# Patient Record
Sex: FEMALE | Race: WHITE | NOT HISPANIC OR LATINO | URBAN - METROPOLITAN AREA
[De-identification: names, ages, dates, MRNs, and addresses within clinical notes are randomized per-mention and may not be internally consistent; named-entity substitution may affect disease eponyms.]

---

## 2018-07-26 ENCOUNTER — EVALUATION (OUTPATIENT)
Dept: PHYSICAL THERAPY | Facility: CLINIC | Age: 63
End: 2018-07-26
Payer: COMMERCIAL

## 2018-07-26 ENCOUNTER — TRANSCRIBE ORDERS (OUTPATIENT)
Dept: PHYSICAL THERAPY | Facility: CLINIC | Age: 63
End: 2018-07-26

## 2018-07-26 DIAGNOSIS — M54.2 CERVICALGIA: Primary | ICD-10-CM

## 2018-07-26 PROCEDURE — 97110 THERAPEUTIC EXERCISES: CPT | Performed by: PHYSICAL THERAPIST

## 2018-07-26 PROCEDURE — G8981 BODY POS CURRENT STATUS: HCPCS | Performed by: PHYSICAL THERAPIST

## 2018-07-26 PROCEDURE — 97161 PT EVAL LOW COMPLEX 20 MIN: CPT | Performed by: PHYSICAL THERAPIST

## 2018-07-26 PROCEDURE — G8982 BODY POS GOAL STATUS: HCPCS | Performed by: PHYSICAL THERAPIST

## 2018-07-26 RX ORDER — LEVOTHYROXINE SODIUM 175 UG/1
175 TABLET ORAL DAILY
COMMUNITY

## 2018-07-26 RX ORDER — VENLAFAXINE 75 MG/1
75 TABLET ORAL 2 TIMES DAILY
COMMUNITY

## 2018-07-26 RX ORDER — METAXALONE 800 MG/1
800 TABLET ORAL 3 TIMES DAILY
COMMUNITY

## 2018-07-26 NOTE — LETTER
2018    Tiffanie Fuller DO  [address]  [facility] [address]    Patient: Jose Roberto Oneal   YOB: 1955   Date of Visit: 2018     Encounter Diagnosis     ICD-10-CM    1  Cervicalgia M54 2        Dear Dr Aviles Blend:    Please review the attached Plan of Care from West jeremie Sheridan's recent visit  Please verify that you agree therapy should continue by signing the attached document and sending it back to our office  If you have any questions or concerns, please don't hesitate to call  Sincerely,    Arlen Berumen, PT      Referring Provider:      I certify that I have read the below Plan of Care and certify the need for these services furnished under this plan of treatment while under my care  Tiffanie Fuller DO  [address] [address]  VIA Facsimile: [phone]          PT Evaluation     Today's date: 2018  Patient name: Jose Roberto Oneal  : 1955  MRN: [id number]  Referring provider: Krunal Delgadillo DO  Dx:   Encounter Diagnosis     ICD-10-CM    1  Cervicalgia M54 2                   Assessment  Impairments: abnormal muscle firing, abnormal muscle tone, abnormal or restricted ROM, abnormal movement, activity intolerance, impaired physical strength, lacks appropriate home exercise program, pain with function and weight-bearing intolerance    Assessment details: Pt is a 65 y o  female that presents with decreased strength, increased pain, decreased rom, functional limitations, and symptoms of c/s ddd, postural dysfunction, and possible right sided radiculopathy  Pt would benefit from skilled PT to return her to a more functional condition  Understanding of Dx/Px/POC: good   Prognosis: good    Goals  1  Pt will have 0/10 pain at rest in 4 weeks  2  Pt will have <2/10 pain with activity in 6-8 weeks  3  Pt will have full arom of the UE's and c/s in 6 weeks  4  Pt will have full prom of the UE's and c/s in 3 weeks  6   Pt will be able to lift 5# overhead with out pain in 6 weeks    Plan  Patient would benefit from: skilled PT  Referral necessary: No  Planned modality interventions: TENS, thermotherapy: hydrocollator packs and cryotherapy  Planned therapy interventions: neuromuscular re-education, manual therapy, therapeutic activities, therapeutic exercise and home exercise program  Frequency: 2x week  Duration in weeks: 8  Plan of Care beginning date: 2018  Plan of Care expiration date: 2018  Treatment plan discussed with: patient        Subjective Evaluation    History of Present Illness  Mechanism of injury: Pt notes that she has a long hx of c/s pain but over the past 3-4 months it has been unbearable and has forced her to leave work at times  She notes that she has HA almost daily  She notes that she gets numbness in the right UE down the back of her arm  She notes that she is off of work until this Monday  She notes that she works on a [employer] all day every day  She has not been able to sleep well for weeks, because of increased discomfort, unless she takes tylenol before bed time  Pain  Current pain ratin  At best pain ratin  At worst pain ratin  Quality: sharp, radiating, dull ache, discomfort, pulling and tight  Aggravating factors: overhead activity and lifting  Progression: worsening    Treatments  Previous treatment: injection treatment and physical therapy  Patient Goals  Patient goals for therapy: decreased pain, increased motion, independence with ADLs/IADLs, increased strength and return to sport/leisure activities          Objective     Special Questions  Positive for night pain, disturbed sleep, dizziness, headaches and visual change   Negative for faints, nausea/motion sickness, tinnitus, trouble swallowing, difficulty breathing, shortness of breath, respiratory pain, history of cancer and history of trauma    Additional Special Questions  Night pain over last 3 months in c/s helped with tylenol, + HA after a day of work, + changes in vision associated with HA    Postural Observations  Seated posture: poor  Standing posture: fair  Correction of posture: has no consistent effect        Palpation   Left   Hypertonic in the scalenes  Tenderness of the levator scapulae, lower trapezius, middle trapezius, scalenes and suboccipitals  Right   Hypertonic in the scalenes and sternocleidomastoid  Tenderness of the levator scapulae, lower trapezius, middle trapezius, scalenes, sternocleidomastoid and suboccipitals       Neurological Testing     Sensation   Cervical/Thoracic   Left   Intact: light touch    Right   Intact: light touch    Reflexes   Left   Biceps (C5/C6): normal (2+)  Brachioradialis (C6): normal (2+)  Triceps (C7): normal (2+)    Right   Biceps (C5/C6): normal (2+)  Brachioradialis (C6): normal (2+)  Triceps (C7): trace (1+)    Active Range of Motion   Cervical/Thoracic Spine   Cervical    Flexion: 55 degrees with pain  Extension: 15 degrees with pain  Left lateral flexion: 15 degrees with pain  Right lateral flexion: 20 degrees with pain  Left rotation: 50 degrees   Right rotation: 55 degrees with pain    Thoracic   Extension: with pain    Joint Play Hypomobile: C4, C5, C6, C7, T1, T2, T3, T4, T5 and T6     Strength/Myotome Testing   Cervical Spine   Neck extension: 3-  Neck flexion: 3+    Left Shoulder     Planes of Motion   Abduction: 4   External rotation at 0°:  3+     Right Shoulder     Planes of Motion   Abduction: 4+   External rotation at 0°:  4-     Left Elbow   Flexion: 4+  Extension: 3+    Right Elbow   Flexion: 4+  Extension: 4-    Left Wrist/Hand   Wrist extension: 4+  Wrist flexion: 3+    Right Wrist/Hand   Wrist extension: 4+  Wrist flexion: 4    Tests   Cervical     Left   Positive cervical distraction, Spurling's sign and cervical compression test       Right   Positive cervical distraction, Spurling's sign and cervical compression test    TMJ   Jaw observations: facial symmetry within normal limits  Jaw findings: + deviation upon opening and closing with the deviation to the right  Occlusion class: class I (normal)  good oral hygiene  Joint sounds left: normal  ROM: limited          Precautions: Fibromyalgia      Daily Treatment Diary       Manuals                                                                 Exercise Diary                                                                                                                                                                                                                                                                                                            Modalities

## 2018-07-26 NOTE — PROGRESS NOTES
PT Evaluation     Today's date: 2018  Patient name: Cristopher Mortimer  : 1955  MRN: [id number]  Referring provider: Mohamud San DO  Dx:   Encounter Diagnosis     ICD-10-CM    1  Cervicalgia M54 2                   Assessment  Impairments: abnormal muscle firing, abnormal muscle tone, abnormal or restricted ROM, abnormal movement, activity intolerance, impaired physical strength, lacks appropriate home exercise program, pain with function and weight-bearing intolerance    Assessment details: Pt is a 65 y o  female that presents with decreased strength, increased pain, decreased rom, functional limitations, and symptoms of c/s ddd, postural dysfunction, and possible right sided radiculopathy  Pt would benefit from skilled PT to return her to a more functional condition  Understanding of Dx/Px/POC: good   Prognosis: good    Goals  1  Pt will have 0/10 pain at rest in 4 weeks  2  Pt will have <2/10 pain with activity in 6-8 weeks  3  Pt will have full arom of the UE's and c/s in 6 weeks  4  Pt will have full prom of the UE's and c/s in 3 weeks  6  Pt will be able to lift 5# overhead with out pain in 6 weeks    Plan  Patient would benefit from: skilled PT  Referral necessary: No  Planned modality interventions: TENS, thermotherapy: hydrocollator packs and cryotherapy  Planned therapy interventions: neuromuscular re-education, manual therapy, therapeutic activities, therapeutic exercise and home exercise program  Frequency: 2x week  Duration in weeks: 8  Plan of Care beginning date: 2018  Plan of Care expiration date: 2018  Treatment plan discussed with: patient        Subjective Evaluation    History of Present Illness  Mechanism of injury: Pt notes that she has a long hx of c/s pain but over the past 3-4 months it has been unbearable and has forced her to leave work at times  She notes that she has HA almost daily  She notes that she gets numbness in the right UE down the back of her arm  She notes that she is off of work until this Monday  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  She has not been able to sleep well for weeks, because of increased discomfort, unless she takes tylenol before bed time  Pain  Current pain ratin  At best pain ratin  At worst pain ratin  Quality: sharp, radiating, dull ache, discomfort, pulling and tight  Aggravating factors: overhead activity and lifting  Progression: worsening    Treatments  Previous treatment: injection treatment and physical therapy  Patient Goals  Patient goals for therapy: decreased pain, increased motion, independence with ADLs/IADLs, increased strength and return to sport/leisure activities          Objective     Special Questions  Positive for night pain, disturbed sleep, dizziness, headaches and visual change  Negative for faints, nausea/motion sickness, tinnitus, trouble swallowing, difficulty breathing, shortness of breath, respiratory pain, history of cancer and history of trauma    Additional Special Questions  Night pain over last 3 months in c/s helped with tylenol, + HA after a day of work, + changes in vision associated with HA    Postural Observations  Seated posture: poor  Standing posture: fair  Correction of posture: has no consistent effect        Palpation   Left   Hypertonic in the scalenes  Tenderness of the levator scapulae, lower trapezius, middle trapezius, scalenes and suboccipitals  Right   Hypertonic in the scalenes and sternocleidomastoid  Tenderness of the levator scapulae, lower trapezius, middle trapezius, scalenes, sternocleidomastoid and suboccipitals       Neurological Testing     Sensation   Cervical/Thoracic   Left   Intact: light touch    Right   Intact: light touch    Reflexes   Left   Biceps (C5/C6): normal (2+)  Brachioradialis (C6): normal (2+)  Triceps (C7): normal (2+)    Right   Biceps (C5/C6): normal (2+)  Brachioradialis (C6): normal (2+)  Triceps (C7): trace (1+)    Active Range of Motion   Cervical/Thoracic Spine   Cervical    Flexion: 55 degrees with pain  Extension: 15 degrees with pain  Left lateral flexion: 15 degrees with pain  Right lateral flexion: 20 degrees with pain  Left rotation: 50 degrees   Right rotation: 55 degrees with pain    Thoracic   Extension: with pain    Joint Play Hypomobile: C4, C5, C6, C7, T1, T2, T3, T4, T5 and T6     Strength/Myotome Testing   Cervical Spine   Neck extension: 3-  Neck flexion: 3+    Left Shoulder     Planes of Motion   Abduction: 4   External rotation at 0°: 3+     Right Shoulder     Planes of Motion   Abduction: 4+   External rotation at 0°: 4-     Left Elbow   Flexion: 4+  Extension: 3+    Right Elbow   Flexion: 4+  Extension: 4-    Left Wrist/Hand   Wrist extension: 4+  Wrist flexion: 3+    Right Wrist/Hand   Wrist extension: 4+  Wrist flexion: 4    Tests   Cervical     Left   Positive cervical distraction, Spurling's sign and cervical compression test       Right   Positive cervical distraction, Spurling's sign and cervical compression test    TMJ   Jaw observations: facial symmetry within normal limits  Jaw findings: + deviation upon opening and closing with the deviation to the right  Occlusion class: class I (normal)  good oral hygiene  Joint sounds left: normal  ROM: limited          Precautions: Fibromyalgia      Daily Treatment Diary       Manuals                                                                 Exercise Diary                                                                                                                                                                                                                                                                                                            Modalities

## 2018-07-30 ENCOUNTER — OFFICE VISIT (OUTPATIENT)
Dept: PHYSICAL THERAPY | Facility: CLINIC | Age: 63
End: 2018-07-30
Payer: COMMERCIAL

## 2018-07-30 DIAGNOSIS — M54.2 CERVICALGIA: Primary | ICD-10-CM

## 2018-07-30 PROCEDURE — 97140 MANUAL THERAPY 1/> REGIONS: CPT

## 2018-07-30 PROCEDURE — G0283 ELEC STIM OTHER THAN WOUND: HCPCS

## 2018-07-30 PROCEDURE — 97014 ELECTRIC STIMULATION THERAPY: CPT

## 2018-07-30 PROCEDURE — 97110 THERAPEUTIC EXERCISES: CPT

## 2018-07-30 PROCEDURE — 97010 HOT OR COLD PACKS THERAPY: CPT

## 2018-07-30 NOTE — PROGRESS NOTES
Daily Note     Today's date: 2018  Patient name: Kasey Espinosa  : 1955  MRN: [id number]  Referring provider: Jeanne Demarco DO  Dx:   Encounter Diagnosis     ICD-10-CM    1  Cervicalgia M54 2                   Subjective: pt is noting that she has a HA along with increased pain but is unsure why , she doesn't feel like exercises are increasing pain  She is worried as she RTW tomorrow      Objective: See treatment diary below  Precautions: Fibromyalgia      Daily Treatment Diary       Manuals             STM B UT/levator scaleprice  10'            SOR Not marjorie            Mobs t/s By DB DPT                         Exercise Diary              Chin tuck   5"x7            iso LB/rot B 5"x5            Prone I's 2x10                                                                                                                                                                                                                                                                   Modalities             MH/TENS  Cont set post 10'                           MH and TENS on continuous setting in supine post treatment      Assessment: Tolerated treatment fair with minimal tolerance to STM B with increased HA noted but was able to tolerate joint mobs without increased sxs  Patient would benefit from continued PT  Pt states that she felt better with less intense HA post treatment session  Plan: Progress treatment as tolerated

## 2018-08-07 ENCOUNTER — OFFICE VISIT (OUTPATIENT)
Dept: PHYSICAL THERAPY | Facility: CLINIC | Age: 63
End: 2018-08-07
Payer: COMMERCIAL

## 2018-08-07 DIAGNOSIS — M54.2 CERVICALGIA: Primary | ICD-10-CM

## 2018-08-07 PROCEDURE — 97110 THERAPEUTIC EXERCISES: CPT

## 2018-08-07 PROCEDURE — G0283 ELEC STIM OTHER THAN WOUND: HCPCS

## 2018-08-07 PROCEDURE — 97010 HOT OR COLD PACKS THERAPY: CPT

## 2018-08-07 PROCEDURE — G8982 BODY POS GOAL STATUS: HCPCS

## 2018-08-07 PROCEDURE — 97140 MANUAL THERAPY 1/> REGIONS: CPT

## 2018-08-07 PROCEDURE — G8981 BODY POS CURRENT STATUS: HCPCS

## 2018-08-07 PROCEDURE — 97014 ELECTRIC STIMULATION THERAPY: CPT

## 2018-08-07 NOTE — PROGRESS NOTES
Daily Note     Today's date: 2018  Patient name: Gloria Holder  : 1955  MRN: [id number]  Referring provider: Jose C Seen,   Dx:   Encounter Diagnosis     ICD-10-CM    1  Cervicalgia M54 2                   Subjective: pt states that she did have less of a HA for 2 days following therapy  Objective: See treatment diary below  Precautions: Fibromyalgia      Daily Treatment Diary       Manuals            STM B UT/jaret robert  10' 12'           SOR Not marjorie Not marjorie           Mobs t/s By DB DPT Db dpt                        Exercise Diary              Chin tuck   5"x7 5"x7           iso LB/rot B 5"x5 5"x5           Prone I's 2x10 2x10                                                                                                                                                                                                                                                                  Modalities             MH/TENS  Cont set post 10'                             Assessment: Tolerated treatment with less TTP over UT/SCM B but still unable to tolerate SOR which increases HA  More tightness noted thoracic region  Plan: Progress treatment as tolerated

## 2018-08-16 ENCOUNTER — OFFICE VISIT (OUTPATIENT)
Dept: PHYSICAL THERAPY | Facility: CLINIC | Age: 63
End: 2018-08-16
Payer: COMMERCIAL

## 2018-08-16 DIAGNOSIS — M54.2 CERVICALGIA: Primary | ICD-10-CM

## 2018-08-16 PROCEDURE — G0283 ELEC STIM OTHER THAN WOUND: HCPCS

## 2018-08-16 PROCEDURE — 97140 MANUAL THERAPY 1/> REGIONS: CPT

## 2018-08-16 PROCEDURE — 97110 THERAPEUTIC EXERCISES: CPT

## 2018-08-16 PROCEDURE — 97014 ELECTRIC STIMULATION THERAPY: CPT

## 2018-08-16 PROCEDURE — 97010 HOT OR COLD PACKS THERAPY: CPT

## 2018-08-16 NOTE — PROGRESS NOTES
Daily Note     Today's date: 2018  Patient name: Latia Soto  : 1955  MRN: [id number]  Referring provider: Rosalinda Whyte DO  Dx:   Encounter Diagnosis     ICD-10-CM    1  Cervicalgia M54 2                   Subjective: fatigued more today      Objective: See treatment diary below  Precautions: Fibromyalgia      Daily Treatment Diary       Manuals           STM B UT/jaret robert  10' 12' 12'          SOR Not marjorie Not marjorie           Mobs t/s By DB DPT Db dpt RN DPT                       Exercise Diary              Chin tuck   5"x7 5"x7 5"x10          iso LB/rot B 5"x5 5"x5 5"x5          Prone I's 2x10 2x10 2x10          Chin tuck and lift   x5          Prone row   2x10                                                                                                                                                                                                                                       Modalities             MH/TENS  Cont set post 10'                               Assessment: Tolerated treatment with ability to add exercises per flowsheet with no increased pain in neck or increased HA  Patient would benefit from continued PT      Plan: Progress treatment as tolerated

## 2018-08-23 ENCOUNTER — OFFICE VISIT (OUTPATIENT)
Dept: PHYSICAL THERAPY | Facility: CLINIC | Age: 63
End: 2018-08-23
Payer: COMMERCIAL

## 2018-08-23 DIAGNOSIS — M54.2 CERVICALGIA: Primary | ICD-10-CM

## 2018-08-23 PROCEDURE — 97110 THERAPEUTIC EXERCISES: CPT

## 2018-08-23 PROCEDURE — 97140 MANUAL THERAPY 1/> REGIONS: CPT

## 2018-08-23 PROCEDURE — 97112 NEUROMUSCULAR REEDUCATION: CPT

## 2018-08-23 NOTE — PROGRESS NOTES
Daily Note     Today's date: 2018  Patient name: Jose Roberto Oneal  : 1955  MRN: [id number]  Referring provider: Krunal Delgadillo DO  Dx:   Encounter Diagnosis     ICD-10-CM    1  Cervicalgia M54 2                 Subjective: Pt reports no change in pain since LV and continues to note neck pain and numbness down RUE  Pain is significantly reduced per pt post-session and she "feels so much looser"  Objective: See treatment diary below    Precautions: Fibromyalgia    Daily Treatment Diary     Manuals          STM B UT/levatorjaret  10' 12'  Rue Lo         SOR Not marjorie Not marjorie  SH         Mobs t/s By DB DPT Db dpt RN DPT DB, PT                      Exercise Diary           Chin tuck   5"x7 5"x7 5"x10 5"x10 seated  and supine         iso LB/rot B 5"x5 5"x5 5"x5 5"x5         Prone I's 2x10 2x10 2x10 -         Chin tuck and lift   x5 5x         Prone row   2x10 -                                                                                                    Modalities             MH/TENS  Cont set post 10'              Assessment: Tolerated treatment well  Patient demonstrated fatigue post treatment and exhibited good technique with therapeutic exercises      Plan: Continue per plan of care  Progress treatment as tolerated        Richard Acosta PTA

## 2018-08-30 ENCOUNTER — OFFICE VISIT (OUTPATIENT)
Dept: PHYSICAL THERAPY | Facility: CLINIC | Age: 63
End: 2018-08-30
Payer: COMMERCIAL

## 2018-08-30 DIAGNOSIS — M54.2 CERVICALGIA: Primary | ICD-10-CM

## 2018-08-30 PROCEDURE — 97010 HOT OR COLD PACKS THERAPY: CPT

## 2018-08-30 PROCEDURE — 97014 ELECTRIC STIMULATION THERAPY: CPT

## 2018-08-30 PROCEDURE — G0283 ELEC STIM OTHER THAN WOUND: HCPCS

## 2018-08-30 PROCEDURE — 97140 MANUAL THERAPY 1/> REGIONS: CPT

## 2018-08-30 PROCEDURE — 97110 THERAPEUTIC EXERCISES: CPT

## 2018-08-30 NOTE — PROGRESS NOTES
Daily Note     Today's date: 2018  Patient name: Cassandra Campbell  : 1955  MRN: [id number]  Referring provider: Denise Dumont DO  Dx:   Encounter Diagnosis     ICD-10-CM    1  Cervicalgia M54 2                   Subjective: she notes that she is feeling better with less pain in neck and less HA from neck  She is expressing difficulty with high copay and DC today  Objective: See treatment diary below  Precautions: Fibromyalgia      Daily Treatment Diary       Manuals          STM B UT/levator, scalenes  10' 12' 12' DL         SOR Not marjorie Not marjorie           Mobs t/s By DB DPT Db dpt RN DPT                       Exercise Diary              Chin tuck   5"x7 5"x7 5"x10 5"x10         iso LB/rot B 5"x5 5"x5 5"x5 5"x5         Prone I's 2x10 2x10 2x10 2x10 3"         Chin tuck and lift   x5 x10         Prone row   2x10 2x10         Ut/levator stretch    20"x5                                                                                                                                                                                                                         Modalities             MH/TENS  Cont set post 10'                               Assessment: Tolerated treatment with decreased sxs  Kathy Keane Updated and reviewed exercises for pt    Also discussed getting own TENS unit OTC for pain relief at home as patient is unable to continue with Pt      Plan: Pt is self discharging as financially she cannot continue due to high copay

## 2020-07-22 ENCOUNTER — TELEPHONE (OUTPATIENT)
Dept: BARIATRICS | Facility: CLINIC | Age: 65
End: 2020-07-22

## 2020-07-22 NOTE — TELEPHONE ENCOUNTER
Lm for pt to call office schedule w/dr Pérez Modest consult for possible revision pt is dr Palma pt started process already for revision

## 2020-10-08 ENCOUNTER — TRANSCRIBE ORDERS (OUTPATIENT)
Dept: ADMINISTRATIVE | Facility: HOSPITAL | Age: 65
End: 2020-10-08

## 2020-10-08 DIAGNOSIS — Z12.31 SCREENING MAMMOGRAM, ENCOUNTER FOR: Primary | ICD-10-CM

## 2020-11-02 ENCOUNTER — HOSPITAL ENCOUNTER (OUTPATIENT)
Dept: MAMMOGRAPHY | Facility: CLINIC | Age: 65
Discharge: HOME | End: 2020-11-02
Payer: COMMERCIAL

## 2020-11-02 VITALS — HEIGHT: 68 IN | WEIGHT: 249 LBS | BODY MASS INDEX: 37.74 KG/M2

## 2020-11-02 DIAGNOSIS — Z12.31 SCREENING MAMMOGRAM, ENCOUNTER FOR: ICD-10-CM

## 2020-11-02 PROCEDURE — 77067 SCR MAMMO BI INCL CAD: CPT

## 2020-11-02 PROCEDURE — 77063 BREAST TOMOSYNTHESIS BI: CPT

## 2021-03-29 ENCOUNTER — TRANSCRIBE ORDERS (OUTPATIENT)
Dept: ADMINISTRATIVE | Facility: HOSPITAL | Age: 66
End: 2021-03-29

## 2021-03-29 ENCOUNTER — APPOINTMENT (OUTPATIENT)
Dept: LAB | Facility: CLINIC | Age: 66
End: 2021-03-29
Payer: COMMERCIAL

## 2021-03-29 DIAGNOSIS — Z71.3 DIETARY COUNSELING: ICD-10-CM

## 2021-03-29 DIAGNOSIS — R93.0 ABNORMAL MRI OF THE HEAD: ICD-10-CM

## 2021-03-29 DIAGNOSIS — E03.9 HYPOTHYROIDISM, UNSPECIFIED TYPE: ICD-10-CM

## 2021-03-29 DIAGNOSIS — E66.9 OBESITY, UNSPECIFIED CLASSIFICATION, UNSPECIFIED OBESITY TYPE, UNSPECIFIED WHETHER SERIOUS COMORBIDITY PRESENT: ICD-10-CM

## 2021-03-29 DIAGNOSIS — K58.9 IRRITABLE BOWEL SYNDROME, UNSPECIFIED TYPE: ICD-10-CM

## 2021-03-29 DIAGNOSIS — F32.9 MAJOR DEPRESSIVE DISORDER WITH SINGLE EPISODE, REMISSION STATUS UNSPECIFIED: ICD-10-CM

## 2021-03-29 DIAGNOSIS — R93.0 FAMILIAL ENLARGEMENT OF THE SELLA TURCICA: ICD-10-CM

## 2021-03-29 DIAGNOSIS — J32.9 SINUSITIS, UNSPECIFIED CHRONICITY, UNSPECIFIED LOCATION: ICD-10-CM

## 2021-03-29 DIAGNOSIS — E66.9 OBESITY, UNSPECIFIED CLASSIFICATION, UNSPECIFIED OBESITY TYPE, UNSPECIFIED WHETHER SERIOUS COMORBIDITY PRESENT: Primary | ICD-10-CM

## 2021-03-29 LAB
ALBUMIN SERPL BCP-MCNC: 4 G/DL (ref 3.5–5)
ALP SERPL-CCNC: 76 U/L (ref 46–116)
ALT SERPL W P-5'-P-CCNC: 33 U/L (ref 12–78)
ANION GAP SERPL CALCULATED.3IONS-SCNC: 3 MMOL/L (ref 4–13)
AST SERPL W P-5'-P-CCNC: 9 U/L (ref 5–45)
BASOPHILS # BLD AUTO: 0.03 THOUSANDS/ΜL (ref 0–0.1)
BASOPHILS NFR BLD AUTO: 1 % (ref 0–1)
BILIRUB SERPL-MCNC: 0.56 MG/DL (ref 0.2–1)
BUN SERPL-MCNC: 14 MG/DL (ref 5–25)
CALCIUM SERPL-MCNC: 9.5 MG/DL (ref 8.3–10.1)
CHLORIDE SERPL-SCNC: 106 MMOL/L (ref 100–108)
CHOLEST SERPL-MCNC: 183 MG/DL (ref 50–200)
CO2 SERPL-SCNC: 32 MMOL/L (ref 21–32)
CREAT SERPL-MCNC: 0.72 MG/DL (ref 0.6–1.3)
EOSINOPHIL # BLD AUTO: 0 THOUSAND/ΜL (ref 0–0.61)
EOSINOPHIL NFR BLD AUTO: 0 % (ref 0–6)
ERYTHROCYTE [DISTWIDTH] IN BLOOD BY AUTOMATED COUNT: 12.1 % (ref 11.6–15.1)
EST. AVERAGE GLUCOSE BLD GHB EST-MCNC: 97 MG/DL
GFR SERPL CREATININE-BSD FRML MDRD: 89 ML/MIN/1.73SQ M
GLUCOSE P FAST SERPL-MCNC: 115 MG/DL (ref 65–99)
HBA1C MFR BLD: 5 %
HCT VFR BLD AUTO: 42 % (ref 34.8–46.1)
HDLC SERPL-MCNC: 62 MG/DL
HGB BLD-MCNC: 14.1 G/DL (ref 11.5–15.4)
IMM GRANULOCYTES # BLD AUTO: 0.02 THOUSAND/UL (ref 0–0.2)
IMM GRANULOCYTES NFR BLD AUTO: 0 % (ref 0–2)
LDLC SERPL CALC-MCNC: 107 MG/DL (ref 0–100)
LYMPHOCYTES # BLD AUTO: 1.45 THOUSANDS/ΜL (ref 0.6–4.47)
LYMPHOCYTES NFR BLD AUTO: 28 % (ref 14–44)
MCH RBC QN AUTO: 30.1 PG (ref 26.8–34.3)
MCHC RBC AUTO-ENTMCNC: 33.6 G/DL (ref 31.4–37.4)
MCV RBC AUTO: 90 FL (ref 82–98)
MONOCYTES # BLD AUTO: 0.32 THOUSAND/ΜL (ref 0.17–1.22)
MONOCYTES NFR BLD AUTO: 6 % (ref 4–12)
NEUTROPHILS # BLD AUTO: 3.45 THOUSANDS/ΜL (ref 1.85–7.62)
NEUTS SEG NFR BLD AUTO: 65 % (ref 43–75)
NONHDLC SERPL-MCNC: 121 MG/DL
NRBC BLD AUTO-RTO: 0 /100 WBCS
PLATELET # BLD AUTO: 187 THOUSANDS/UL (ref 149–390)
PMV BLD AUTO: 10.3 FL (ref 8.9–12.7)
POTASSIUM SERPL-SCNC: 4.2 MMOL/L (ref 3.5–5.3)
PROT SERPL-MCNC: 7.2 G/DL (ref 6.4–8.2)
RBC # BLD AUTO: 4.68 MILLION/UL (ref 3.81–5.12)
SODIUM SERPL-SCNC: 141 MMOL/L (ref 136–145)
T4 FREE SERPL-MCNC: 1.14 NG/DL (ref 0.76–1.46)
TRIGL SERPL-MCNC: 71 MG/DL
TSH SERPL DL<=0.05 MIU/L-ACNC: 0.08 UIU/ML (ref 0.36–3.74)
WBC # BLD AUTO: 5.27 THOUSAND/UL (ref 4.31–10.16)

## 2021-03-29 PROCEDURE — 83036 HEMOGLOBIN GLYCOSYLATED A1C: CPT

## 2021-03-29 PROCEDURE — 36415 COLL VENOUS BLD VENIPUNCTURE: CPT

## 2021-03-29 PROCEDURE — 85025 COMPLETE CBC W/AUTO DIFF WBC: CPT

## 2021-03-29 PROCEDURE — 80061 LIPID PANEL: CPT

## 2021-03-29 PROCEDURE — 84443 ASSAY THYROID STIM HORMONE: CPT

## 2021-03-29 PROCEDURE — 84439 ASSAY OF FREE THYROXINE: CPT

## 2021-03-29 PROCEDURE — 80053 COMPREHEN METABOLIC PANEL: CPT

## 2021-04-14 ENCOUNTER — TRANSCRIBE ORDERS (OUTPATIENT)
Dept: ADMINISTRATIVE | Facility: HOSPITAL | Age: 66
End: 2021-04-14

## 2021-04-14 ENCOUNTER — HOSPITAL ENCOUNTER (EMERGENCY)
Facility: HOSPITAL | Age: 66
Discharge: HOME | End: 2021-04-14
Attending: EMERGENCY MEDICINE | Admitting: EMERGENCY MEDICINE
Payer: COMMERCIAL

## 2021-04-14 ENCOUNTER — APPOINTMENT (EMERGENCY)
Dept: CT IMAGING | Facility: HOSPITAL | Age: 66
End: 2021-04-14
Payer: COMMERCIAL

## 2021-04-14 VITALS
WEIGHT: 235 LBS | RESPIRATION RATE: 20 BRPM | OXYGEN SATURATION: 92 % | HEART RATE: 80 BPM | SYSTOLIC BLOOD PRESSURE: 139 MMHG | BODY MASS INDEX: 35.73 KG/M2 | TEMPERATURE: 98.9 F | DIASTOLIC BLOOD PRESSURE: 82 MMHG

## 2021-04-14 DIAGNOSIS — R10.9 ABDOMINAL PAIN: Primary | ICD-10-CM

## 2021-04-14 DIAGNOSIS — K76.9 LIVER LESION: ICD-10-CM

## 2021-04-14 DIAGNOSIS — R19.7 DIARRHEA: ICD-10-CM

## 2021-04-14 DIAGNOSIS — R11.0 NAUSEA: ICD-10-CM

## 2021-04-14 LAB
ALBUMIN SERPL BCP-MCNC: 3.9 G/DL (ref 3.5–5)
ALP SERPL-CCNC: 103 U/L (ref 46–116)
ALT SERPL W P-5'-P-CCNC: 35 U/L (ref 12–78)
ANION GAP SERPL CALCULATED.3IONS-SCNC: 10 MMOL/L (ref 4–13)
AST SERPL W P-5'-P-CCNC: 19 U/L (ref 5–45)
BASOPHILS # BLD AUTO: 0.04 THOUSANDS/ΜL (ref 0–0.1)
BASOPHILS NFR BLD AUTO: 1 % (ref 0–1)
BILIRUB SERPL-MCNC: 0.6 MG/DL (ref 0.2–1)
BILIRUB UR QL STRIP: NEGATIVE
BUN SERPL-MCNC: 12 MG/DL (ref 5–25)
CALCIUM SERPL-MCNC: 9.3 MG/DL (ref 8.3–10.1)
CHLORIDE SERPL-SCNC: 103 MMOL/L (ref 100–108)
CLARITY UR: CLEAR
CO2 SERPL-SCNC: 30 MMOL/L (ref 21–32)
COLOR UR: YELLOW
CREAT SERPL-MCNC: 0.84 MG/DL (ref 0.6–1.3)
EOSINOPHIL # BLD AUTO: 0.08 THOUSAND/ΜL (ref 0–0.61)
EOSINOPHIL NFR BLD AUTO: 1 % (ref 0–6)
ERYTHROCYTE [DISTWIDTH] IN BLOOD BY AUTOMATED COUNT: 11.9 % (ref 11.6–15.1)
GFR SERPL CREATININE-BSD FRML MDRD: 74 ML/MIN/1.73SQ M
GLUCOSE SERPL-MCNC: 109 MG/DL (ref 65–140)
GLUCOSE UR STRIP-MCNC: NEGATIVE MG/DL
HCT VFR BLD AUTO: 41.8 % (ref 34.8–46.1)
HGB BLD-MCNC: 14.5 G/DL (ref 11.5–15.4)
HGB UR QL STRIP.AUTO: NEGATIVE
IMM GRANULOCYTES # BLD AUTO: 0.05 THOUSAND/UL (ref 0–0.2)
IMM GRANULOCYTES NFR BLD AUTO: 1 % (ref 0–2)
KETONES UR STRIP-MCNC: NEGATIVE MG/DL
LEUKOCYTE ESTERASE UR QL STRIP: NEGATIVE
LIPASE SERPL-CCNC: 95 U/L (ref 73–393)
LYMPHOCYTES # BLD AUTO: 1.53 THOUSANDS/ΜL (ref 0.6–4.47)
LYMPHOCYTES NFR BLD AUTO: 19 % (ref 14–44)
MCH RBC QN AUTO: 30.1 PG (ref 26.8–34.3)
MCHC RBC AUTO-ENTMCNC: 34.7 G/DL (ref 31.4–37.4)
MCV RBC AUTO: 87 FL (ref 82–98)
MONOCYTES # BLD AUTO: 0.51 THOUSAND/ΜL (ref 0.17–1.22)
MONOCYTES NFR BLD AUTO: 6 % (ref 4–12)
NEUTROPHILS # BLD AUTO: 6.07 THOUSANDS/ΜL (ref 1.85–7.62)
NEUTS SEG NFR BLD AUTO: 72 % (ref 43–75)
NITRITE UR QL STRIP: NEGATIVE
NRBC BLD AUTO-RTO: 0 /100 WBCS
PH UR STRIP.AUTO: 6.5 [PH]
PLATELET # BLD AUTO: 153 THOUSANDS/UL (ref 149–390)
PMV BLD AUTO: 9.9 FL (ref 8.9–12.7)
POTASSIUM SERPL-SCNC: 3.8 MMOL/L (ref 3.5–5.3)
PROT SERPL-MCNC: 7.4 G/DL (ref 6.4–8.2)
PROT UR STRIP-MCNC: NEGATIVE MG/DL
RBC # BLD AUTO: 4.81 MILLION/UL (ref 3.81–5.12)
SODIUM SERPL-SCNC: 143 MMOL/L (ref 136–145)
SP GR UR STRIP.AUTO: <=1.005 (ref 1–1.03)
TROPONIN I SERPL-MCNC: <0.02 NG/ML
UROBILINOGEN UR QL STRIP.AUTO: 0.2 E.U./DL
WBC # BLD AUTO: 8.28 THOUSAND/UL (ref 4.31–10.16)

## 2021-04-14 PROCEDURE — 99284 EMERGENCY DEPT VISIT MOD MDM: CPT | Performed by: EMERGENCY MEDICINE

## 2021-04-14 PROCEDURE — 96374 THER/PROPH/DIAG INJ IV PUSH: CPT

## 2021-04-14 PROCEDURE — 80053 COMPREHEN METABOLIC PANEL: CPT | Performed by: EMERGENCY MEDICINE

## 2021-04-14 PROCEDURE — 83690 ASSAY OF LIPASE: CPT | Performed by: EMERGENCY MEDICINE

## 2021-04-14 PROCEDURE — 93005 ELECTROCARDIOGRAM TRACING: CPT

## 2021-04-14 PROCEDURE — 85025 COMPLETE CBC W/AUTO DIFF WBC: CPT | Performed by: EMERGENCY MEDICINE

## 2021-04-14 PROCEDURE — 81003 URINALYSIS AUTO W/O SCOPE: CPT | Performed by: EMERGENCY MEDICINE

## 2021-04-14 PROCEDURE — C9113 INJ PANTOPRAZOLE SODIUM, VIA: HCPCS | Performed by: EMERGENCY MEDICINE

## 2021-04-14 PROCEDURE — 99284 EMERGENCY DEPT VISIT MOD MDM: CPT

## 2021-04-14 PROCEDURE — 36415 COLL VENOUS BLD VENIPUNCTURE: CPT | Performed by: EMERGENCY MEDICINE

## 2021-04-14 PROCEDURE — 96375 TX/PRO/DX INJ NEW DRUG ADDON: CPT

## 2021-04-14 PROCEDURE — 96361 HYDRATE IV INFUSION ADD-ON: CPT

## 2021-04-14 PROCEDURE — G1004 CDSM NDSC: HCPCS

## 2021-04-14 PROCEDURE — 74177 CT ABD & PELVIS W/CONTRAST: CPT

## 2021-04-14 PROCEDURE — 84484 ASSAY OF TROPONIN QUANT: CPT | Performed by: EMERGENCY MEDICINE

## 2021-04-14 RX ORDER — DICYCLOMINE HCL 20 MG
20 TABLET ORAL 2 TIMES DAILY
Qty: 20 TABLET | Refills: 0 | Status: SHIPPED | OUTPATIENT
Start: 2021-04-14

## 2021-04-14 RX ORDER — DICYCLOMINE HYDROCHLORIDE 10 MG/1
10 CAPSULE ORAL
COMMUNITY

## 2021-04-14 RX ORDER — ONDANSETRON 2 MG/ML
4 INJECTION INTRAMUSCULAR; INTRAVENOUS ONCE
Status: COMPLETED | OUTPATIENT
Start: 2021-04-14 | End: 2021-04-14

## 2021-04-14 RX ORDER — PANTOPRAZOLE SODIUM 40 MG/1
40 INJECTION, POWDER, FOR SOLUTION INTRAVENOUS ONCE
Status: COMPLETED | OUTPATIENT
Start: 2021-04-14 | End: 2021-04-14

## 2021-04-14 RX ORDER — KETOROLAC TROMETHAMINE 30 MG/ML
30 INJECTION, SOLUTION INTRAMUSCULAR; INTRAVENOUS ONCE
Status: COMPLETED | OUTPATIENT
Start: 2021-04-14 | End: 2021-04-14

## 2021-04-14 RX ORDER — TRIAMCINOLONE ACETONIDE 1 MG/G
CREAM TOPICAL
COMMUNITY
Start: 2021-03-26

## 2021-04-14 RX ADMIN — SODIUM CHLORIDE 1000 ML: 0.9 INJECTION, SOLUTION INTRAVENOUS at 20:49

## 2021-04-14 RX ADMIN — PANTOPRAZOLE SODIUM 40 MG: 40 INJECTION, POWDER, FOR SOLUTION INTRAVENOUS at 20:49

## 2021-04-14 RX ADMIN — IOHEXOL 100 ML: 350 INJECTION, SOLUTION INTRAVENOUS at 22:31

## 2021-04-14 RX ADMIN — KETOROLAC TROMETHAMINE 30 MG: 30 INJECTION, SOLUTION INTRAMUSCULAR at 20:51

## 2021-04-14 RX ADMIN — ONDANSETRON 4 MG: 2 INJECTION INTRAMUSCULAR; INTRAVENOUS at 20:49

## 2021-04-15 LAB
ATRIAL RATE: 76 BPM
P AXIS: 77 DEGREES
PR INTERVAL: 178 MS
QRS AXIS: 65 DEGREES
QRSD INTERVAL: 84 MS
QT INTERVAL: 406 MS
QTC INTERVAL: 456 MS
T WAVE AXIS: 55 DEGREES
VENTRICULAR RATE: 76 BPM

## 2021-04-15 PROCEDURE — 93010 ELECTROCARDIOGRAM REPORT: CPT | Performed by: INTERNAL MEDICINE

## 2021-04-15 NOTE — DISCHARGE INSTRUCTIONS
You had an abnormality on your liver which will require further imaging as follows, please follow-up with your primary care provider for further care:    Small hypodense lesions in the liver for which further evaluation with dedicated ultrasound of the liver is recommended  If the lesions are not visible on ultrasound dedicated CT scan or MRI of the liver with contrast is recommended on a nonemergent , basis  ,

## 2021-04-15 NOTE — ED PROVIDER NOTES
History  Chief Complaint   Patient presents with    Abdominal Problem     c/o abdominal pain, distention, diarrhea x 1 week  Recent dx of IBS, has US scheduled     This is a 61-year-old female presents with upper abdominal pain bloating and diarrhea over the past week denies any recent antibiotics or travel no blood in the stool denies any excessive anti-inflammatory use or alcohol  Does have a prior history of hysterectomy and cholecystectomy  Was seen by her primary care physician and had an ultrasound of the abdomen ordered for this coming Monday      History provided by:  Patient  Medical Problem  Location:  Upper abdominal  Quality:  Bloating discomfort  Severity:  Moderate  Onset quality:  Gradual  Duration:  1 week  Timing:  Constant  Progression:  Waxing and waning  Chronicity:  New  Context:  Upper abdominal pain bloating and diarrhea over the past week  Relieved by:  Nothing  Worsened by:  Palpation  Associated symptoms: abdominal pain, diarrhea, headaches and nausea        Prior to Admission Medications   Prescriptions Last Dose Informant Patient Reported?  Taking?   dicyclomine (BENTYL) 10 mg capsule   Yes Yes   Sig: Take 10 mg by mouth 4 (four) times a day (before meals and at bedtime)   levothyroxine 175 mcg tablet   Yes No   Sig: Take 175 mcg by mouth daily   metaxalone (SKELAXIN) 800 mg tablet   Yes No   Sig: Take 800 mg by mouth 3 (three) times a day   triamcinolone (KENALOG) 0 1 % cream   Yes No   venlafaxine (EFFEXOR) 75 mg tablet   Yes No   Sig: Take 75 mg by mouth 2 (two) times a day      Facility-Administered Medications: None       Past Medical History:   Diagnosis Date    Arthritis     Fibromyalgia     Mood swings     Thyroid activity decreased        Past Surgical History:   Procedure Laterality Date    BREAST BIOPSY Left     benign    CHOLECYSTECTOMY      HYSTERECTOMY      OOPHORECTOMY Right        Family History   Problem Relation Age of Onset    Diabetes Father     Prostate cancer Father     Diabetes Other    Rabia Rosas Breast cancer Mother     Breast cancer Sister 61    Bone cancer Maternal Grandmother     Cancer Paternal Aunt      I have reviewed and agree with the history as documented  E-Cigarette/Vaping    E-Cigarette Use Never User      E-Cigarette/Vaping Substances    Nicotine No     THC No     CBD No     Flavoring No     Other No     Unknown No      Social History     Tobacco Use    Smoking status: Former Smoker     Quit date: 1988     Years since quittin 7    Smokeless tobacco: Never Used   Substance Use Topics    Alcohol use: Yes     Frequency: Monthly or less    Drug use: Never       Review of Systems   Gastrointestinal: Positive for abdominal pain, diarrhea and nausea  Neurological: Positive for headaches  All other systems reviewed and are negative  Physical Exam  Physical Exam  Vitals signs and nursing note reviewed  Constitutional:       General: She is not in acute distress  Appearance: She is not ill-appearing, toxic-appearing or diaphoretic  HENT:      Head: Normocephalic and atraumatic  Right Ear: Tympanic membrane, ear canal and external ear normal       Left Ear: Tympanic membrane, ear canal and external ear normal    Eyes:      General: No scleral icterus  Right eye: No discharge  Left eye: No discharge  Extraocular Movements: Extraocular movements intact  Pupils: Pupils are equal, round, and reactive to light  Neck:      Musculoskeletal: Normal range of motion and neck supple  No neck rigidity or muscular tenderness  Cardiovascular:      Rate and Rhythm: Normal rate and regular rhythm  Pulses: Normal pulses  Heart sounds: Normal heart sounds  No murmur  No friction rub  No gallop  Pulmonary:      Effort: Pulmonary effort is normal  No respiratory distress  Breath sounds: Normal breath sounds  No stridor  No wheezing, rhonchi or rales  Chest:      Chest wall: No tenderness  Abdominal:      General: Abdomen is flat  Bowel sounds are normal  There is no distension  Palpations: Abdomen is soft  There is no mass  Tenderness: There is abdominal tenderness (Epigastric)  There is no guarding or rebound  Musculoskeletal: Normal range of motion  General: No swelling, tenderness, deformity or signs of injury  Right lower leg: No edema  Left lower leg: No edema  Skin:     General: Skin is warm and dry  Coloration: Skin is not jaundiced  Findings: No bruising, erythema or rash  Neurological:      General: No focal deficit present  Mental Status: She is alert and oriented to person, place, and time  Cranial Nerves: No cranial nerve deficit  Sensory: No sensory deficit  Motor: No weakness  Coordination: Coordination normal       Gait: Gait normal    Psychiatric:         Mood and Affect: Mood normal          Behavior: Behavior normal          Thought Content:  Thought content normal          Vital Signs  ED Triage Vitals [04/1955]   Temperature Pulse Respirations Blood Pressure SpO2   98 9 °F (37 2 °C) 88 18 147/72 99 %      Temp Source Heart Rate Source Patient Position - Orthostatic VS BP Location FiO2 (%)   Temporal Monitor Lying Right arm --      Pain Score       5           Vitals:    04/1955 04/14/21 2330   BP: 147/72 139/82   Pulse: 88 80   Patient Position - Orthostatic VS: Lying          Visual Acuity      ED Medications  Medications   sodium chloride 0 9 % bolus 1,000 mL (0 mL Intravenous Stopped 4/14/21 2340)   ondansetron (ZOFRAN) injection 4 mg (4 mg Intravenous Given 4/14/21 2049)   pantoprazole (PROTONIX) injection 40 mg (40 mg Intravenous Given 4/14/21 2049)   ketorolac (TORADOL) injection 30 mg (30 mg Intravenous Given 4/14/21 2051)   iohexol (OMNIPAQUE) 350 MG/ML injection (SINGLE-DOSE) 100 mL (100 mL Intravenous Given 4/14/21 2231)       Diagnostic Studies  Results Reviewed     Procedure Component Value Units Date/Time    Troponin I [[id number]]  (Normal) Collected: 04/14/21 2041    Lab Status: Final result Specimen: Blood from Arm, Left Updated: 04/14/21 2229     Troponin I <0 02 ng/mL     UA w Reflex to Microscopic w Reflex to Culture [[id number]] Collected: 04/14/21 2211    Lab Status: Final result Specimen: Urine, Clean Catch Updated: 04/14/21 2219     Color, UA Yellow     Clarity, UA Clear     Specific Gravity, UA <=1 005     pH, UA 6 5     Leukocytes, UA Negative     Nitrite, UA Negative     Protein, UA Negative mg/dl      Glucose, UA Negative mg/dl      Ketones, UA Negative mg/dl      Urobilinogen, UA 0 2 E U /dl      Bilirubin, UA Negative     Blood, UA Negative    Lipase [[id number]]  (Normal) Collected: 04/14/21 2041    Lab Status: Final result Specimen: Blood from Arm, Left Updated: 04/14/21 2118     Lipase 95 u/L     Comprehensive metabolic panel [[id number]] Collected: 04/14/21 2041    Lab Status: Final result Specimen: Blood from Arm, Left Updated: 04/14/21 2118     Sodium 143 mmol/L      Potassium 3 8 mmol/L      Chloride 103 mmol/L      CO2 30 mmol/L      ANION GAP 10 mmol/L      BUN 12 mg/dL      Creatinine 0 84 mg/dL      Glucose 109 mg/dL      Calcium 9 3 mg/dL      AST 19 U/L      ALT 35 U/L      Alkaline Phosphatase 103 U/L      Total Protein 7 4 g/dL      Albumin 3 9 g/dL      Total Bilirubin 0 60 mg/dL      eGFR 74 ml/min/1 73sq m     Narrative:      Katie guidelines for Chronic Kidney Disease (CKD):     Stage 1 with normal or high GFR (GFR > 90 mL/min/1 73 square meters)    Stage 2 Mild CKD (GFR = 60-89 mL/min/1 73 square meters)    Stage 3A Moderate CKD (GFR = 45-59 mL/min/1 73 square meters)    Stage 3B Moderate CKD (GFR = 30-44 mL/min/1 73 square meters)    Stage 4 Severe CKD (GFR = 15-29 mL/min/1 73 square meters)    Stage 5 End Stage CKD (GFR <15 mL/min/1 73 square meters)  Note: GFR calculation is accurate only with a steady state creatinine    CBC and differential [[id number]] Collected: 04/14/21 2041    Lab Status: Final result Specimen: Blood from Arm, Left Updated: 04/14/21 2059     WBC 8 28 Thousand/uL      RBC 4 81 Million/uL      Hemoglobin 14 5 g/dL      Hematocrit 41 8 %      MCV 87 fL      MCH 30 1 pg      MCHC 34 7 g/dL      RDW 11 9 %      MPV 9 9 fL      Platelets 338 Thousands/uL      nRBC 0 /100 WBCs      Neutrophils Relative 72 %      Immat GRANS % 1 %      Lymphocytes Relative 19 %      Monocytes Relative 6 %      Eosinophils Relative 1 %      Basophils Relative 1 %      Neutrophils Absolute 6 07 Thousands/µL      Immature Grans Absolute 0 05 Thousand/uL      Lymphocytes Absolute 1 53 Thousands/µL      Monocytes Absolute 0 51 Thousand/µL      Eosinophils Absolute 0 08 Thousand/µL      Basophils Absolute 0 04 Thousands/µL     Stool Enteric Bacterial Panel by PCR [[id number]]     Lab Status: No result Specimen: Stool from Per Rectum                  CT abdomen pelvis with contrast   Final Result by Gianni Shell, DO (04/14 2318)      Liquid stool within the colon which may represent diarrheal illness  Small bowel internal hernia in the right hemiabdomen without evidence of bowel obstruction  Small hypodense lesions in the liver for which further evaluation with dedicated ultrasound of the liver is recommended  If the lesions are not visible on ultrasound dedicated CT scan or MRI of the liver with contrast is recommended on a nonemergent    basis  The study was marked in [facility] for immediate notification  Workstation performed: [id number]                    Procedures  Procedures         ED Course                             SBIRT 20yo+      Most Recent Value   SBIRT (23 yo +)   In order to provide better care to our patients, we are screening all of our patients for alcohol and drug use  Would it be okay to ask you these screening questions?   No Filed at: 04/14/2021 2007                    [facility]  Number of Diagnoses or Management Options  Diagnosis management comments: Upper abdominal pain differential includes peptic ulcer disease versus colitis bowel obstruction workup in progress including lab work and CT scan       Amount and/or Complexity of Data Reviewed  Clinical lab tests: ordered  Tests in the radiology section of CPT®: ordered        Disposition  Final diagnoses:   Abdominal pain   Diarrhea   Liver lesion     Time reflects when diagnosis was documented in both MDM as applicable and the Disposition within this note     Time User Action Codes Description Comment    4/14/2021 11:32 PM Zakiya Deeds Add [R10 9] Abdominal pain     4/14/2021 11:32 PM Zakiya Deeds Add [R19 7] Diarrhea     4/14/2021 11:32 PM Zakiya Deeds Add [K76 9] Liver lesion       ED Disposition     ED Disposition Condition Date/Time Comment    Discharge Stable Wed Apr 14, 2021 11:33 PM Sammie Barron discharge to home/self care              Follow-up Information     Follow up With Specialties Details Why Contact Info Additional Information    Urbano Armendariz DO Family Medicine Schedule an appointment as soon as possible for a visit   [facility] [address] [facility] Emergency Department Emergency Medicine  If symptoms worsen [address] [address]  [address] [facility] Emergency Department, [address] [place] [address], Jannie Yang Sulaim[address]    [address] [facility] Gastroenterology Schedule an appointment as soon as possible for a visit  liver lesion, abdominal pain, diarrhea [address] [address] [phone] [address] [facility] [address], [place], [address] [phone]          Discharge Medication List as of 4/14/2021 11:34 PM      START taking these medications    Details   dicyclomine (BENTYL) 20 mg tablet Take 1 tablet (20 mg total) by mouth 2 (two) times a day, Starting Wed 4/14/2021, Normal         CONTINUE these medications which have NOT CHANGED    Details   dicyclomine (BENTYL) 10 mg capsule Take 10 mg by mouth 4 (four) times a day (before meals and at bedtime), Historical Med      levothyroxine 175 mcg tablet Take 175 mcg by mouth daily, Historical Med      metaxalone (SKELAXIN) 800 mg tablet Take 800 mg by mouth 3 (three) times a day, Historical Med      triamcinolone (KENALOG) 0 1 % cream Starting Fri 3/26/2021, Historical Med      venlafaxine (EFFEXOR) 75 mg tablet Take 75 mg by mouth 2 (two) times a day, Historical Med           Outpatient Discharge Orders   Stool Enteric Bacterial Panel by PCR   Standing Status: Future Standing Exp   Date: 04/14/22       PDMP Review     None          ED Provider  Electronically Signed by           Joycelyn Stephens DO  04/15/21 4798

## 2021-04-19 ENCOUNTER — HOSPITAL ENCOUNTER (OUTPATIENT)
Dept: ULTRASOUND IMAGING | Facility: HOSPITAL | Age: 66
Discharge: HOME | End: 2021-04-19
Payer: COMMERCIAL

## 2021-04-19 DIAGNOSIS — R10.9 ABDOMINAL PAIN: ICD-10-CM

## 2021-04-19 DIAGNOSIS — R11.0 NAUSEA: ICD-10-CM

## 2021-04-19 PROCEDURE — 76700 US EXAM ABDOM COMPLETE: CPT

## 2021-04-21 ENCOUNTER — TRANSCRIBE ORDERS (OUTPATIENT)
Dept: ADMINISTRATIVE | Facility: HOSPITAL | Age: 66
End: 2021-04-21

## 2021-04-21 DIAGNOSIS — R22.9 LOCALIZED SUPERFICIAL SWELLING, MASS, OR LUMP: Primary | ICD-10-CM

## 2021-05-06 NOTE — NURSING NOTE
Spoke with nurse Atilio Soto at Dr Dottie Watkins office re MRI abdomen with and without contrast  Pt has allergy with hives reaction in chart after last MRI with kris in 2018  Notified office  allergy prep should be ordered-standard allergy prep information given if IV contrast is to be given  Nurse will discuss with physician  Left message on patient vm to verify allergy of hives and that allergy prep will be ordered by Dr Arlette Cadet if IV contrast is to be given  Will attempt to contact patient again

## 2021-05-07 NOTE — NURSING NOTE
Spoke with Nurse Freddy Craig from Dr Wanda Pino office  She states she ust spoke with patient to inform her allergy prep script is being sent to her pharmacy and she she reviewed allergy prep instructions with patient

## 2021-05-09 ENCOUNTER — HOSPITAL ENCOUNTER (OUTPATIENT)
Dept: MRI IMAGING | Facility: HOSPITAL | Age: 66
Discharge: HOME | End: 2021-05-09
Payer: COMMERCIAL

## 2021-05-09 DIAGNOSIS — R22.9 LOCALIZED SUPERFICIAL SWELLING, MASS, OR LUMP: ICD-10-CM

## 2021-05-09 PROCEDURE — A9585 GADOBUTROL INJECTION: HCPCS | Performed by: RADIOLOGY

## 2021-05-09 PROCEDURE — 74183 MRI ABD W/O CNTR FLWD CNTR: CPT

## 2021-05-09 RX ADMIN — GADOBUTROL 10 ML: 604.72 INJECTION INTRAVENOUS at 13:55

## 2021-05-09 NOTE — LETTER
[address] [place] [address]  [address] [facility] [address]      May 18, 2021    MRN: [id number]     Phone: [phone]     Dear Ms  Bhavana Ly recently had a(n) MRI performed on 5/9/2021 at  [address] [place] [address] that was requested by Mery Booker DO  The study was reviewed by a radiologist, which is a physician who specializes in medical imaging  The radiologist issued a report describing his or her findings  In that report there was a finding that the radiologist felt warranted further discussion with your health care provider and that discussion would be beneficial to you  The results were sent to Mery Booker DO on 5/13/2021  We recommend that you contact Mery Booker DO at [phone] or set up an appointment to discuss the results of the imaging test  If you have already heard from Mery Booker DO regarding the results of your study, you can disregard this letter  This letter is not meant to alarm you, but intended to encourage you to follow-up on your results with the provider that sent you for the imaging study  In addition, we have enclosed answers to frequently asked questions by other patients who have also received a letter to review results with their health care provider (see page two)  Thank you for choosing [address] [place] [address] for your medical imaging needs  FREQUENTLY ASKED QUESTIONS    1  Why am I receiving this letter? [facility] requires us to notify patients who have findings on imaging exams that may require more testing or follow-up with a health professional within the next 3 months  2  How serious is the finding on the imaging test?  This letter is sent to all patients who may need follow-up or more testing within the next 3 months  Receiving this letter does not necessarily mean you have a life-threatening imaging finding or disease  Recommendations in the radiologists imaging report are general in nature and it is up to your healthcare provider to say whether those recommendations make sense for your situation  You are strongly encouraged to talk to your health care provider about the results and ask whether additional steps need to be taken  3  Where can I get a copy of the final report for my recent radiology exam?  To get a full copy of the report you can access your records online at [email or web address] or please contact [address] [place] [address] Department at [phone] Monday through Friday between 8 am and 6 pm          4  What do I need to do now? Please contact your health care provider who requested the imaging study to discuss what further actions (if any) are needed  You may have already heard from (your ordering provider) in regard to this test in which case you can disregard this letter  NOTICE IN ACCORDANCE WITH THE [facility] PATIENT TEST RESULT INFORMATION ACT OF 2018    You are receiving this notice as a result of a determination by your diagnostic imaging service that further discussions of your test results are warranted and would be beneficial to you  The complete results of your test or tests have been or will be sent to the health care practitioner that ordered the test or tests  It is recommended that you contact your health care practitioner to discuss your results as soon as possible

## 2022-02-07 ENCOUNTER — HOSPITAL ENCOUNTER (OUTPATIENT)
Dept: MRI IMAGING | Facility: HOSPITAL | Age: 67
Discharge: HOME | End: 2022-02-07
Payer: MEDICARE

## 2022-02-07 DIAGNOSIS — K76.9 LIVER LESION: ICD-10-CM

## 2022-02-07 PROCEDURE — 74183 MRI ABD W/O CNTR FLWD CNTR: CPT

## 2022-02-07 PROCEDURE — A9585 GADOBUTROL INJECTION: HCPCS | Performed by: RADIOLOGY

## 2022-02-07 RX ADMIN — GADOBUTROL 10 ML: 604.72 INJECTION INTRAVENOUS at 12:21

## 2022-02-28 ENCOUNTER — HOSPITAL ENCOUNTER (EMERGENCY)
Facility: HOSPITAL | Age: 67
Discharge: HOME | End: 2022-02-28
Attending: EMERGENCY MEDICINE | Admitting: EMERGENCY MEDICINE
Payer: MEDICARE

## 2022-02-28 ENCOUNTER — APPOINTMENT (EMERGENCY)
Dept: RADIOLOGY | Facility: HOSPITAL | Age: 67
End: 2022-02-28
Payer: MEDICARE

## 2022-02-28 VITALS
WEIGHT: 240 LBS | HEART RATE: 69 BPM | SYSTOLIC BLOOD PRESSURE: 178 MMHG | RESPIRATION RATE: 16 BRPM | BODY MASS INDEX: 36.37 KG/M2 | TEMPERATURE: 97 F | DIASTOLIC BLOOD PRESSURE: 72 MMHG | OXYGEN SATURATION: 97 % | HEIGHT: 68 IN

## 2022-02-28 DIAGNOSIS — S89.91XA RIGHT KNEE INJURY, INITIAL ENCOUNTER: Primary | ICD-10-CM

## 2022-02-28 PROCEDURE — 96372 THER/PROPH/DIAG INJ SC/IM: CPT

## 2022-02-28 PROCEDURE — 73630 X-RAY EXAM OF FOOT: CPT

## 2022-02-28 PROCEDURE — 73564 X-RAY EXAM KNEE 4 OR MORE: CPT

## 2022-02-28 PROCEDURE — 73590 X-RAY EXAM OF LOWER LEG: CPT

## 2022-02-28 PROCEDURE — 99283 EMERGENCY DEPT VISIT LOW MDM: CPT

## 2022-02-28 PROCEDURE — 99284 EMERGENCY DEPT VISIT MOD MDM: CPT | Performed by: EMERGENCY MEDICINE

## 2022-02-28 PROCEDURE — 73610 X-RAY EXAM OF ANKLE: CPT

## 2022-02-28 RX ORDER — METHYLPREDNISOLONE 4 MG/1
TABLET ORAL
Qty: 21 TABLET | Refills: 0 | Status: SHIPPED | OUTPATIENT
Start: 2022-02-28

## 2022-02-28 RX ORDER — KETOROLAC TROMETHAMINE 30 MG/ML
30 INJECTION, SOLUTION INTRAMUSCULAR; INTRAVENOUS ONCE
Status: COMPLETED | OUTPATIENT
Start: 2022-02-28 | End: 2022-02-28

## 2022-02-28 RX ORDER — ACETAMINOPHEN 325 MG/1
650 TABLET ORAL ONCE
Status: COMPLETED | OUTPATIENT
Start: 2022-02-28 | End: 2022-02-28

## 2022-02-28 RX ADMIN — KETOROLAC TROMETHAMINE 30 MG: 30 INJECTION, SOLUTION INTRAMUSCULAR; INTRAVENOUS at 16:08

## 2022-02-28 RX ADMIN — ACETAMINOPHEN 650 MG: 325 TABLET, FILM COATED ORAL at 13:40

## 2022-03-01 ENCOUNTER — OFFICE VISIT (OUTPATIENT)
Dept: OBGYN CLINIC | Facility: CLINIC | Age: 67
End: 2022-03-01
Payer: MEDICARE

## 2022-03-01 ENCOUNTER — APPOINTMENT (OUTPATIENT)
Dept: RADIOLOGY | Facility: CLINIC | Age: 67
End: 2022-03-01
Payer: MEDICARE

## 2022-03-01 VITALS
SYSTOLIC BLOOD PRESSURE: 146 MMHG | BODY MASS INDEX: 36.83 KG/M2 | DIASTOLIC BLOOD PRESSURE: 80 MMHG | HEIGHT: 68 IN | WEIGHT: 243 LBS

## 2022-03-01 DIAGNOSIS — M17.11 PRIMARY OSTEOARTHRITIS OF RIGHT KNEE: Primary | ICD-10-CM

## 2022-03-01 DIAGNOSIS — M25.561 ACUTE PAIN OF RIGHT KNEE: ICD-10-CM

## 2022-03-01 DIAGNOSIS — M25.561 RIGHT KNEE PAIN, UNSPECIFIED CHRONICITY: ICD-10-CM

## 2022-03-01 PROCEDURE — 73564 X-RAY EXAM KNEE 4 OR MORE: CPT

## 2022-03-01 PROCEDURE — 20610 DRAIN/INJ JOINT/BURSA W/O US: CPT | Performed by: ORTHOPAEDIC SURGERY

## 2022-03-01 PROCEDURE — 99204 OFFICE O/P NEW MOD 45 MIN: CPT | Performed by: ORTHOPAEDIC SURGERY

## 2022-03-01 RX ORDER — LIDOCAINE HYDROCHLORIDE 10 MG/ML
7 INJECTION, SOLUTION INFILTRATION; PERINEURAL
Status: COMPLETED | OUTPATIENT
Start: 2022-03-01 | End: 2022-03-01

## 2022-03-01 RX ORDER — BETAMETHASONE SODIUM PHOSPHATE AND BETAMETHASONE ACETATE 3; 3 MG/ML; MG/ML
6 INJECTION, SUSPENSION INTRA-ARTICULAR; INTRALESIONAL; INTRAMUSCULAR; SOFT TISSUE
Status: COMPLETED | OUTPATIENT
Start: 2022-03-01 | End: 2022-03-01

## 2022-03-01 RX ADMIN — BETAMETHASONE SODIUM PHOSPHATE AND BETAMETHASONE ACETATE 6 MG: 3; 3 INJECTION, SUSPENSION INTRA-ARTICULAR; INTRALESIONAL; INTRAMUSCULAR; SOFT TISSUE at 10:32

## 2022-03-01 RX ADMIN — LIDOCAINE HYDROCHLORIDE 7 ML: 10 INJECTION, SOLUTION INFILTRATION; PERINEURAL at 10:32

## 2022-03-01 NOTE — ED PROVIDER NOTES
History  Chief Complaint   Patient presents with    Leg Pain     c/o right leg pain related to "bumping knee against desk 2 weeks ago", pt states "I called 911 bc the pain was awful last night at 11:00pm"  Pt reports seeing PCP 2/22/22 and was provided medication  Denies fall/trauma since  Denies anticoags  81-year-old female presents for evaluation severe right knee pain that has been ongoing for the past 2 weeks  The patient states that she bumped her knee against her desk at home when she was swimming on a chair and struck the lateral aspect of her right knee  The patient followed up with her primary care provider was provided meloxicam without relief  The patient states that her pain is worse when she attempts to ambulate has had significant difficulty doing so  The patient denies any associated numbness or tingling  Patient denies any color change or significant swelling  Prior to Admission Medications   Prescriptions Last Dose Informant Patient Reported?  Taking?   dicyclomine (BENTYL) 10 mg capsule   Yes No   Sig: Take 10 mg by mouth 4 (four) times a day (before meals and at bedtime)   dicyclomine (BENTYL) 20 mg tablet   No No   Sig: Take 1 tablet (20 mg total) by mouth 2 (two) times a day   levothyroxine 175 mcg tablet   Yes No   Sig: Take 175 mcg by mouth daily   metaxalone (SKELAXIN) 800 mg tablet   Yes No   Sig: Take 800 mg by mouth 3 (three) times a day   triamcinolone (KENALOG) 0 1 % cream   Yes No   venlafaxine (EFFEXOR) 75 mg tablet   Yes No   Sig: Take 75 mg by mouth 2 (two) times a day      Facility-Administered Medications: None       Past Medical History:   Diagnosis Date    Arthritis     Fibromyalgia     Mood swings     Thyroid activity decreased        Past Surgical History:   Procedure Laterality Date    BREAST BIOPSY Left     benign    CHOLECYSTECTOMY      HYSTERECTOMY      OOPHORECTOMY Right        Family History   Problem Relation Age of Onset    Diabetes Father  Prostate cancer Father     Diabetes Other    Dario Up Breast cancer Mother     Breast cancer Sister 61    Bone cancer Maternal Grandmother     Cancer Paternal Aunt      I have reviewed and agree with the history as documented  E-Cigarette/Vaping    E-Cigarette Use Never User      E-Cigarette/Vaping Substances    Nicotine No     THC No     CBD No     Flavoring No     Other No     Unknown No      Social History     Tobacco Use    Smoking status: Former Smoker     Quit date: 1988     Years since quittin 6    Smokeless tobacco: Never Used   Vaping Use    Vaping Use: Never used   Substance Use Topics    Alcohol use: Yes    Drug use: Never       Review of Systems   Musculoskeletal: Positive for arthralgias and gait problem  All other systems reviewed and are negative  Physical Exam  Physical Exam  Vitals and nursing note reviewed  Constitutional:       General: She is not in acute distress  Appearance: She is well-developed  HENT:      Head: Normocephalic and atraumatic  Right Ear: External ear normal       Left Ear: External ear normal    Eyes:      General: No scleral icterus  Pulmonary:      Effort: Pulmonary effort is normal  No respiratory distress  Abdominal:      General: There is no distension  Musculoskeletal:      Cervical back: Normal range of motion  Right knee: Bony tenderness present  Decreased range of motion  Tenderness present  Skin:     General: Skin is warm and dry  Findings: No rash  Neurological:      Mental Status: She is alert  Mental status is at baseline     Psychiatric:         Mood and Affect: Mood normal          Vital Signs  ED Triage Vitals [22 1335]   Temperature Pulse Respirations Blood Pressure SpO2   (!) 97 °F (36 1 °C) 69 16 (!) 178/72 97 %      Temp Source Heart Rate Source Patient Position - Orthostatic VS BP Location FiO2 (%)   Temporal -- Sitting Left arm --      Pain Score       9           Vitals:    22 1335   BP: (!) 178/72   Pulse: 69   Patient Position - Orthostatic VS: Sitting         Visual Acuity      ED Medications  Medications   acetaminophen (TYLENOL) tablet 650 mg (650 mg Oral Given 2/28/22 1340)   ketorolac (TORADOL) injection 30 mg (30 mg Intramuscular Given 2/28/22 1608)       Diagnostic Studies  Results Reviewed     None                 XR knee 4+ views RIGHT   Final Result by Andreas Phalen, MD (02/28 1540)      No acute osseous abnormality  Workstation performed: [id number]         XR tibia fibula 2 views RIGHT   Final Result by Andreas Phalen, MD (02/28 1540)      No acute osseous abnormality  Workstation performed: [id number]         XR ankle 3+ views RIGHT   Final Result by Andreas Phalen, MD (02/28 1541)      No acute osseous abnormality  Workstation performed: [id number]         XR foot 3+ views RIGHT   Final Result by Andreas Phalen, MD (02/28 1541)      No acute osseous abnormality  Workstation performed: [id number]                    Procedures  Procedures         ED Course  ED Course as of 02/28/22 2209 Mon Feb 28, 2022   1544 No acute fractures noted on the x-rays  I personally discussed and reviewed the images with Dr Jace Barlow from Radiology                                             MDM  Number of Diagnoses or Management Options  Right knee injury, initial encounter  Diagnosis management comments: There are no acute osseous abnormalities on imaging  I discussed the plan for steroids for pain  The patient will also be placed in a knee brace and given a walker to assist with ambulation  The patient will follow-up with orthopedics as an outpatient as the patient may meniscal or ligamentous injury  The patient (and any family present) verbalized understanding of the discharge instructions and warnings that would necessitate return to the Emergency Department      All questions were answered prior to discharge  Amount and/or Complexity of Data Reviewed  Tests in the radiology section of CPT®: reviewed  Review and summarize past medical records: yes        Disposition  Final diagnoses:   Right knee injury, initial encounter     Time reflects when diagnosis was documented in both MDM as applicable and the Disposition within this note     Time User Action Codes Description Comment    2/28/2022  3:46 PM Rosio Stinson Right knee injury, initial encounter       ED Disposition     ED Disposition Condition Date/Time Comment    Discharge Stable Mon Feb 28, 2022  3:46 PM Jolanta Denny discharge to home/self care              Follow-up Information     Follow up With Specialties Details Why Contact Info Additional [phone] [facility] Orthopedic Surgery Schedule an appointment as soon as possible for a visit  For further evaluation [facility] [phone] [place] [phone]  [address] [place] [address] Specialists [facility], [address] [place] [address] [phone]          Discharge Medication List as of 2/28/2022  3:47 PM      START taking these medications    Details   methylPREDNISolone 4 MG tablet therapy pack Use as directed on package, Normal         CONTINUE these medications which have NOT CHANGED    Details   dicyclomine (BENTYL) 10 mg capsule Take 10 mg by mouth 4 (four) times a day (before meals and at bedtime), Historical Med      dicyclomine (BENTYL) 20 mg tablet Take 1 tablet (20 mg total) by mouth 2 (two) times a day, Starting Wed 4/14/2021, Normal      levothyroxine 175 mcg tablet Take 175 mcg by mouth daily, Historical Med      metaxalone (SKELAXIN) 800 mg tablet Take 800 mg by mouth 3 (three) times a day, Historical Med      triamcinolone (KENALOG) 0 1 % cream Starting Fri 3/26/2021, Historical Med      venlafaxine (EFFEXOR) 75 mg tablet Take 75 mg by mouth 2 (two) times a day, Historical Med             No discharge procedures on file      PDMP Review     None          ED Provider  Electronically Signed by           Demi Bustillos,   02/28/22 8103

## 2022-03-01 NOTE — PROGRESS NOTES
Assessment:     1  Primary osteoarthritis of right knee    2  Acute pain of right knee        Plan:     Problem List Items Addressed This Visit        Musculoskeletal and Integument    Primary osteoarthritis of right knee - Primary     Findings consistent with mild to moderate right knee medial and patellofemoral compartmental osteoarthritis  Imaging and prognosis reviewed with patient  I discussed with the patient that she likely exacerbated her arthritis in her right knee from striking her knee  Non-operative treatments were discussed with the patient in the forms of a right knee aspiration and corticosteroid injection, gentle range of motion and a right hinged knee brace  We discussed the risks and benefits of corticosteroid injection today in the office and she did elect to proceed  The right knee aspiration and corticosteroid injection were administered without issue and well tolerated by the patient with good pain relief  This was done under sterile technique  Post injection instructions were provided  She understands can be repeated no sooner than three months  She was fitted for a right knee hinged knee brace at today's visit  She was instructed to discontinue use of the T-ROM brace  I discussed with the patient that she may use meloxicam and either Voltaren gel or Aspercreme to help alleviate her pain  I will follow-up with her in 6-8 weeks for a clinical re-evaluation  All patient's questions were answered to her satisfaction  This note is created using dictation transcription  It may contain typographical errors, grammatical errors, improperly dictated words, background noise and other errors           Relevant Medications    lidocaine (XYLOCAINE) 1 % injection 7 mL (Completed)    betamethasone acetate-betamethasone sodium phosphate (CELESTONE) injection 6 mg (Completed)    Other Relevant Orders    Brace    Large joint arthrocentesis: R knee (Completed)      Other Visit Diagnoses     Acute pain of right knee        Relevant Medications    lidocaine (XYLOCAINE) 1 % injection 7 mL (Completed)    betamethasone acetate-betamethasone sodium phosphate (CELESTONE) injection 6 mg (Completed)    Other Relevant Orders    XR knee 4+ vw right injury    Brace    Large joint arthrocentesis: R knee (Completed)         Subjective:     Patient ID: Echo Nolasco is a 73 y o  female  Chief Complaint:  Patient is a 73year old who presents to the office today for an initial evaluation of her right knee  She states that her right knee pain began about 2 weeks ago after striking the anterior aspect of her right knee on her desk  She states that she will experience a daily constant sharp pain  She localizes her pain over the anterior, posterior and medial aspects of her right knee  She states that her pain is exacerbated with weight-bearing activities  She states that she was seen in the ED on 2/28/2022 due to increased right knee pain and unable to ambulate  She states that x-rays were taken and she was provided with T-ROM brace and a walker  She will use Tylenol and heat to help alleviate her pain  Information on patient's intake form was reviewed      Allergy:  Allergies   Allergen Reactions    Cefuroxime Hives    Celecoxib Other (See Comments)    Clindamycin Other (See Comments)    Duloxetine Hcl Other (See Comments)    Gadolinium Derivatives Hives     Hives after MRI [facility] per patient    Iodinated Diagnostic Agents Itching and Swelling    Penicillins Other (See Comments)     Unknown       Medications:  all current active meds have been reviewed  Past Medical History:  Past Medical History:   Diagnosis Date    Arthritis     Fibromyalgia     Mood swings     Thyroid activity decreased      Past Surgical History:  Past Surgical History:   Procedure Laterality Date    BREAST BIOPSY Left     benign    CHOLECYSTECTOMY      HYSTERECTOMY      OOPHORECTOMY Right      Family History:  Family History Problem Relation Age of Onset    Diabetes Father     Prostate cancer Father     Diabetes Other    Jan Yadav Breast cancer Mother     Breast cancer Sister 61    Bone cancer Maternal Grandmother     Cancer Paternal Aunt      Social History:  Social History     Substance and Sexual Activity   Alcohol Use Yes     Social History     Substance and Sexual Activity   Drug Use Never     Social History     Tobacco Use   Smoking Status Former Smoker    Quit date: 1988    Years since quittin 6   Smokeless Tobacco Never Used     Review of Systems   Constitutional: Positive for activity change  Negative for chills, fever and unexpected weight change  HENT: Negative for hearing loss, nosebleeds and sore throat  Eyes: Negative for pain, redness and visual disturbance  Respiratory: Negative for cough, shortness of breath and wheezing  Cardiovascular: Negative for chest pain, palpitations and leg swelling  Gastrointestinal: Negative for abdominal pain, nausea and vomiting  Endocrine: Negative for polyphagia and polyuria  Genitourinary: Negative for dysuria and hematuria  Musculoskeletal: Positive for arthralgias (Right knee), gait problem (Antalgic), joint swelling (Right knee) and myalgias  Skin: Negative for rash and wound  Neurological: Negative for dizziness, numbness and headaches  Psychiatric/Behavioral: Negative for confusion and suicidal ideas  The patient is not nervous/anxious  Objective:  BP Readings from Last 1 Encounters:   22 146/80      Wt Readings from Last 1 Encounters:   22 110 kg (243 lb)      BMI:   Estimated body mass index is 37 5 kg/m² as calculated from the following:    Height as of this encounter: 5' 7 5" (1 715 m)  Weight as of this encounter: 110 kg (243 lb)  BSA:   Estimated body surface area is 2 21 meters squared as calculated from the following:    Height as of this encounter: 5' 7 5" (1 715 m)      Weight as of this encounter: 110 kg (243 lb)    Physical Exam  Vitals reviewed  Constitutional:       Appearance: She is well-developed  She is obese  HENT:      Head: Normocephalic and atraumatic  Right Ear: External ear normal       Left Ear: External ear normal    Eyes:      General:         Right eye: No discharge  Left eye: No discharge  Extraocular Movements: Extraocular movements intact  Conjunctiva/sclera: Conjunctivae normal    Pulmonary:      Effort: Pulmonary effort is normal  No respiratory distress  Musculoskeletal:      Cervical back: Neck supple  Right knee: Effusion (Grade 1) present  Instability Tests: Lateral Aranza test positive  Medial Aranza test negative  Skin:     General: Skin is warm and dry  Neurological:      General: No focal deficit present  Mental Status: She is alert and oriented to person, place, and time  Psychiatric:         Mood and Affect: Mood normal          Behavior: Behavior normal        Right Knee Exam     Tenderness   The patient is experiencing tenderness in the medial joint line and lateral joint line (Anterolateral)  Range of Motion   Extension: 0   Flexion: 90     Tests   Aranza:  Medial - negative Lateral - positive  Varus: negative Valgus: negative  Patellar apprehension: negative    Other   Erythema: absent  Scars: absent  Sensation: normal  Pulse: present  Swelling: mild  Effusion: effusion (Grade 1) present              I have personally reviewed pertinent films in PACS and my interpretation is as follows:  X-rays performed in the office today of her right knee demonstrates mild-to-moderate medial and patellofemoral compartmental osteoarthritis  There is evidence of joint space narrowing and osteophytes present  There are no acute fractures, dislocations, lytic or blastic lesions  Large joint arthrocentesis: R knee  Universal Protocol:  Consent: Verbal consent obtained    Risks and benefits: risks, benefits and alternatives were discussed  Consent given by: patient  Patient understanding: patient states understanding of the procedure being performed  Site marked: the operative site was marked  Supporting Documentation  Indications: pain and joint swelling   Procedure Details  Location: knee - R knee  Preparation: Patient was prepped and draped in the usual sterile fashion  Needle size: 18 G  Ultrasound guidance: no  Approach: Superolateral   Medications administered: 7 mL lidocaine 1 %; 6 mg betamethasone acetate-betamethasone sodium phosphate 6 (3-3) mg/mL    Aspirate amount: 5 (Same needle used for aspiration and injection) mL  Aspirate: clear, serous and yellow    Patient tolerance: patient tolerated the procedure well with no immediate complications  Dressing:  Sterile dressing applied    5 cc 1% lidocaine was used for local anesthetic        Scribe Attestation    I,:  Anna Fong am acting as a scribe while in the presence of the attending physician :       I,:  Janice Horton MD personally performed the services described in this documentation    as scribed in my presence :

## 2022-03-01 NOTE — ASSESSMENT & PLAN NOTE
Findings consistent with mild to moderate right knee medial and patellofemoral compartmental osteoarthritis  Imaging and prognosis reviewed with patient  I discussed with the patient that she likely exacerbated her arthritis in her right knee from striking her knee  Non-operative treatments were discussed with the patient in the forms of a right knee aspiration and corticosteroid injection, gentle range of motion and a right hinged knee brace  We discussed the risks and benefits of corticosteroid injection today in the office and she did elect to proceed  The right knee aspiration and corticosteroid injection were administered without issue and well tolerated by the patient with good pain relief  This was done under sterile technique  Post injection instructions were provided  She understands can be repeated no sooner than three months  She was fitted for a right knee hinged knee brace at today's visit  She was instructed to discontinue use of the T-ROM brace  I discussed with the patient that she may use meloxicam and either Voltaren gel or Aspercreme to help alleviate her pain  I will follow-up with her in 6-8 weeks for a clinical re-evaluation  All patient's questions were answered to her satisfaction  This note is created using dictation transcription  It may contain typographical errors, grammatical errors, improperly dictated words, background noise and other errors

## 2022-10-25 NOTE — ED NOTES
Patient states that she has IBS and has been having rough times at work, she notes correlation to stressful days at work and increased IBS attacks  The pt states that she follows her PCP with her IBS and was ordered an U/S for Monday but was hoping to have it sooner  She states that she is taking Bentyl x3 a day with moderate relief on some days but not all        Brett Luther, RN  04/14/21 2007 nasal cannula nasal cannula

## 2023-06-30 LAB — HBA1C MFR BLD HPLC: 5.5 %

## 2023-08-02 ENCOUNTER — OFFICE VISIT (OUTPATIENT)
Dept: OBGYN CLINIC | Facility: CLINIC | Age: 68
End: 2023-08-02
Payer: MEDICARE

## 2023-08-02 ENCOUNTER — APPOINTMENT (OUTPATIENT)
Dept: RADIOLOGY | Facility: CLINIC | Age: 68
End: 2023-08-02
Payer: MEDICARE

## 2023-08-02 VITALS
SYSTOLIC BLOOD PRESSURE: 138 MMHG | BODY MASS INDEX: 38.19 KG/M2 | HEIGHT: 68 IN | DIASTOLIC BLOOD PRESSURE: 80 MMHG | WEIGHT: 252 LBS

## 2023-08-02 DIAGNOSIS — M17.11 PRIMARY OSTEOARTHRITIS OF RIGHT KNEE: ICD-10-CM

## 2023-08-02 DIAGNOSIS — S80.01XA CONTUSION OF RIGHT KNEE, INITIAL ENCOUNTER: Primary | ICD-10-CM

## 2023-08-02 PROCEDURE — 73564 X-RAY EXAM KNEE 4 OR MORE: CPT

## 2023-08-02 PROCEDURE — 99214 OFFICE O/P EST MOD 30 MIN: CPT | Performed by: ORTHOPAEDIC SURGERY

## 2023-08-02 NOTE — ASSESSMENT & PLAN NOTE
Findings consistent with right knee contusion with hematoma. Findings and treatment options were discussed with the patient. X-rays were reviewed with her that revealed no acute fractures. Discussed that the swelling and bruising as a result of the injury and hematoma. Recommend frequent icing and elevating to help reduce the swelling and pain. She is to keep her knee moving gently to prevent stiffness. Recommend use of a compression stocking during the day to help with lower leg swelling. She can take it off in the evening. Continue NSAIDs as needed for pain. Avoid any squatting, kneeling or climbing. Follow-up in 6 weeks for reevaluation. All patient's questions were answered to her satisfaction. This note is created using dictation transcription. It may contain typographical errors, grammatical errors, improperly dictated words, background noise and other errors.

## 2023-08-02 NOTE — PROGRESS NOTES
Assessment:     1. Contusion of right knee, initial encounter        Plan:     Problem List Items Addressed This Visit        Other    Contusion of right knee - Primary     Findings consistent with right knee contusion with hematoma. Findings and treatment options were discussed with the patient. X-rays were reviewed with her that revealed no acute fractures. Discussed that the swelling and bruising as a result of the injury and hematoma. Recommend frequent icing and elevating to help reduce the swelling and pain. She is to keep her knee moving gently to prevent stiffness. Recommend use of a compression stocking during the day to help with lower leg swelling. She can take it off in the evening. Continue NSAIDs as needed for pain. Avoid any squatting, kneeling or climbing. Follow-up in 6 weeks for reevaluation. All patient's questions were answered to her satisfaction. This note is created using dictation transcription. It may contain typographical errors, grammatical errors, improperly dictated words, background noise and other errors. Relevant Orders    XR knee 4+ vw right injury      Subjective:     Patient ID: Melinda Davis is a 78 y.o. female. Chief Complaint: This is a 67-year-old white female here for evaluation of a new injury to her right knee. Patient states injury occurred about 1 week ago in her home. She fell landing on carpet directly on the right knee. She is unsure if there was something on the ground that she hit when she fell. She had immediate pain and swelling in the right knee. She developed bruising as well. The fall also caused an abrasion to the anterior aspect of the right knee. She has continued to have pain, swelling and ecchymosis since then. She has increased pain when ambulating. She is ambulating with no assistance today. No treatment as of yet. She was last seen for her right knee in March 2022.   She was treated for a contusion with osteoarthritis with an aspiration and cortisone injection and did well at that time. Allergy:  Allergies   Allergen Reactions   • Cefuroxime Hives   • Celecoxib Other (See Comments)   • Clindamycin Other (See Comments)   • Duloxetine Hcl Other (See Comments)   • Gadolinium Derivatives Hives     Hives after MRI Brain at [facility] 2018 per patient   • Iodinated Contrast Media Itching and Swelling   • Penicillins Other (See Comments)     Unknown       Medications:  all current active meds have been reviewed  Past Medical History:  Past Medical History:   Diagnosis Date   • Arthritis    • Fibromyalgia    • Mood swings    • Thyroid activity decreased      Past Surgical History:  Past Surgical History:   Procedure Laterality Date   • BREAST BIOPSY Left     benign   • CHOLECYSTECTOMY     • HYSTERECTOMY     • OOPHORECTOMY Right      Family History:  Family History   Problem Relation Age of Onset   • Diabetes Father    • Prostate cancer Father    • Diabetes Other    • Breast cancer Mother    • Breast cancer Sister 61   • Bone cancer Maternal Grandmother    • Cancer Paternal Aunt      Social History:  Social History     Substance and Sexual Activity   Alcohol Use Yes     Social History     Substance and Sexual Activity   Drug Use Never     Social History     Tobacco Use   Smoking Status Former   • Types: Cigarettes   • Quit date: 1988   • Years since quittin.0   Smokeless Tobacco Never     Review of Systems   Constitutional: Negative for chills, fever and unexpected weight change. HENT: Negative for hearing loss, nosebleeds and sore throat. Eyes: Negative for pain, redness and visual disturbance. Respiratory: Negative for cough, shortness of breath and wheezing. Cardiovascular: Negative for chest pain, palpitations and leg swelling. Gastrointestinal: Negative for abdominal pain, nausea and vomiting. Endocrine: Negative for polyphagia and polyuria. Genitourinary: Negative for dysuria and hematuria.    Musculoskeletal: Positive for arthralgias (Right knee), gait problem (Antalgic) and joint swelling (Right knee). Skin: Negative for rash and wound. Allergic/Immunologic: Negative. Neurological: Negative for dizziness, numbness and headaches. Psychiatric/Behavioral: Negative for confusion and suicidal ideas. The patient is not nervous/anxious. Objective:  BP Readings from Last 1 Encounters:   08/02/23 138/80      Wt Readings from Last 1 Encounters:   08/02/23 114 kg (252 lb)      BMI:   Estimated body mass index is 38.89 kg/m² as calculated from the following:    Height as of this encounter: 5' 7.5" (1.715 m). Weight as of this encounter: 114 kg (252 lb). BSA:   Estimated body surface area is 2.24 meters squared as calculated from the following:    Height as of this encounter: 5' 7.5" (1.715 m). Weight as of this encounter: 114 kg (252 lb). Physical Exam  Vitals reviewed. Constitutional:       Appearance: She is well-developed. She is obese. HENT:      Head: Normocephalic and atraumatic. Right Ear: External ear normal.      Left Ear: External ear normal.   Eyes:      General:         Right eye: No discharge. Left eye: No discharge. Extraocular Movements: Extraocular movements intact. Conjunctiva/sclera: Conjunctivae normal.   Pulmonary:      Effort: Pulmonary effort is normal. No respiratory distress. Musculoskeletal:      Cervical back: Neck supple. Right knee:      Instability Tests: Medial Aranza test negative and lateral Aranza test negative. Skin:     General: Skin is warm and dry. Neurological:      General: No focal deficit present. Mental Status: She is alert and oriented to person, place, and time. Psychiatric:         Mood and Affect: Mood normal.         Behavior: Behavior normal.       Right Knee Exam     Tenderness   The patient is experiencing tenderness in the patella and patellar tendon (diffuse anterior).     Range of Motion   Extension: 0 Flexion: 120     Tests   Aranza:  Medial - negative Lateral - negative  Varus: negative Valgus: negative  Patellar apprehension: negative    Other   Erythema: absent  Sensation: normal  Pulse: present  Swelling: moderate    Comments:  Soft tissue swelling anteriorly  Healing abrasion over anterior aspect of knee  Resolving ecchymosis over knee extending down to ankle  Calf soft, nontender              I have personally reviewed pertinent films in PACS and my interpretation is X-rays of the right knee reveal no acute fractures. There is moderate tricompartmental osteoarthritis worse in the medial and patellofemoral compartments.      Scribe Attestation    I,:  Asia Pérez PA-C am acting as a scribe while in the presence of the attending physician.:       I,:  Gopi Gasca MD personally performed the services described in this documentation    as scribed in my presence.:

## 2023-08-03 ENCOUNTER — HOSPITAL ENCOUNTER (OUTPATIENT)
Dept: ULTRASOUND IMAGING | Facility: CLINIC | Age: 68
Discharge: HOME | End: 2023-08-03
Payer: MEDICARE

## 2023-08-03 DIAGNOSIS — R79.89 ELEVATED LIVER FUNCTION TESTS: ICD-10-CM

## 2023-08-03 PROCEDURE — 76705 ECHO EXAM OF ABDOMEN: CPT

## 2023-09-01 ENCOUNTER — OFFICE VISIT (OUTPATIENT)
Dept: GASTROENTEROLOGY | Facility: CLINIC | Age: 68
End: 2023-09-01
Payer: MEDICARE

## 2023-09-01 ENCOUNTER — APPOINTMENT (OUTPATIENT)
Dept: LAB | Facility: CLINIC | Age: 68
End: 2023-09-01
Payer: MEDICARE

## 2023-09-01 VITALS
HEIGHT: 67 IN | BODY MASS INDEX: 39.39 KG/M2 | WEIGHT: 251 LBS | SYSTOLIC BLOOD PRESSURE: 118 MMHG | DIASTOLIC BLOOD PRESSURE: 82 MMHG

## 2023-09-01 DIAGNOSIS — K76.0 FATTY LIVER DISEASE, NONALCOHOLIC: Primary | ICD-10-CM

## 2023-09-01 DIAGNOSIS — Z12.11 COLON CANCER SCREENING: ICD-10-CM

## 2023-09-01 DIAGNOSIS — Z11.59 ENCOUNTER FOR SCREENING FOR OTHER VIRAL DISEASES: ICD-10-CM

## 2023-09-01 DIAGNOSIS — K58.8 OTHER IRRITABLE BOWEL SYNDROME: ICD-10-CM

## 2023-09-01 LAB
FERRITIN SERPL-MCNC: 58 NG/ML (ref 11–307)
IRON SATN MFR SERPL: 26 % (ref 15–50)
IRON SERPL-MCNC: 95 UG/DL (ref 50–212)
TIBC SERPL-MCNC: 360 UG/DL (ref 250–450)
UIBC SERPL-MCNC: 265 UG/DL (ref 155–355)

## 2023-09-01 PROCEDURE — 36415 COLL VENOUS BLD VENIPUNCTURE: CPT

## 2023-09-01 PROCEDURE — 82728 ASSAY OF FERRITIN: CPT

## 2023-09-01 PROCEDURE — 99204 OFFICE O/P NEW MOD 45 MIN: CPT | Performed by: STUDENT IN AN ORGANIZED HEALTH CARE EDUCATION/TRAINING PROGRAM

## 2023-09-01 PROCEDURE — 86015 ACTIN ANTIBODY EACH: CPT

## 2023-09-01 PROCEDURE — 86803 HEPATITIS C AB TEST: CPT

## 2023-09-01 PROCEDURE — 83540 ASSAY OF IRON: CPT

## 2023-09-01 PROCEDURE — 83550 IRON BINDING TEST: CPT

## 2023-09-01 PROCEDURE — 87340 HEPATITIS B SURFACE AG IA: CPT

## 2023-09-01 PROCEDURE — 86704 HEP B CORE ANTIBODY TOTAL: CPT

## 2023-09-01 PROCEDURE — 86706 HEP B SURFACE ANTIBODY: CPT

## 2023-09-01 PROCEDURE — 86381 MITOCHONDRIAL ANTIBODY EACH: CPT

## 2023-09-01 PROCEDURE — 86038 ANTINUCLEAR ANTIBODIES: CPT

## 2023-09-01 RX ORDER — LEVOTHYROXINE SODIUM 137 UG/1
TABLET ORAL
COMMUNITY
Start: 2023-07-01

## 2023-09-01 RX ORDER — ATENOLOL 25 MG/1
TABLET ORAL
COMMUNITY
Start: 2023-08-27

## 2023-09-01 RX ORDER — GABAPENTIN 300 MG/1
300 CAPSULE ORAL 2 TIMES DAILY
COMMUNITY
Start: 2023-08-16

## 2023-09-01 RX ORDER — IBUPROFEN 200 MG
2 TABLET ORAL EVERY 6 HOURS PRN
COMMUNITY

## 2023-09-01 NOTE — PROGRESS NOTES
Consultation - [address] [place] [address] Gastroenterology     [facility] Santiago 78 y.o. female MRN: [id number]  Unit/Bed#:  Encounter: [id number]    Consults    ASSESSMENT and PLAN    1. Fatty liver disease, nonalcoholic  Severe steatosis seen on imaging, no evidence of cirrhosis. Presumably this is nonalcoholic fatty liver disease. She has no history of heavy alcohol use or hepatitis exposure in the past.  No findings of cirrhosis and no clinical features of acute liver injury such as jaundice, confusion, asterixis. We will work-up to exclude other causes for her imaging findings. Discussed need for lifestyle modifications and overall goal of 5 to 10% loss of total body weight over 1 year with diet and exercise. She requested nutrition consult which was provided. - Hepatitis A antibody, total; Future  - Hepatitis B core antibody, total; Future  - Hepatitis B surface antibody; Future  - Hepatitis B surface antigen; Future  - Hepatitis C antibody; Future  - Iron, TIBC and Ferritin Panel; Future  - US elastography/UGAP; Future  - JOSUE Screen w/ Reflex to Titer/Pattern; Future  - Antimitochondrial antibody; Future  - Anti-smooth muscle antibody, IgG; Future  - Hepatitis A antibody, total  - Hepatitis B core antibody, total  - Hepatitis B surface antibody  - Hepatitis B surface antigen  - Hepatitis C antibody  - Antimitochondrial antibody  - Anti-smooth muscle antibody, IgG  - Ambulatory Referral to Nutrition Services; Future    2. Encounter for screening for other viral diseases  Given findings of fatty liver, exclude hepatitis ABC, autoimmune disease, iron overload syndromes. - Hepatitis B core antibody, total; Future  - Hepatitis B surface antibody; Future  - Hepatitis B surface antigen; Future  - Hepatitis B core antibody, total  - Hepatitis B surface antibody  - Hepatitis B surface antigen    3. Other irritable bowel syndrome  Chronic, stable. No need for new medications at this time.     4. Colon cancer screening  She is due for colonoscopy. However we will plan to address her liver issues first, see back in 6 months and then discuss colonoscopy. Chief Complaint   Patient presents with   • fatty liver and elavated liver labs       Physician Requesting Consult:     JOYCE Land is a very pleasant 78y.o. year old female with a past medical history of IBS presenting for evaluation of fatty liver. She had routine blood work done by her primary care provider on June 30 with finding of elevated ALT (59). Other LFTs, CBC and BMP unremarkable. , total cholesterol 184, HDL 55. A1c 5.5. TSH 3.160. She states she was referred by her PCP for further evaluation. Follow-up ultrasound was notable for severe hepatic steatosis, no evidence of cirrhosis, no ductal dilation. She is status postcholecystectomy several years ago. Denies heavy alcohol use, over-the-counter herbal supplement use, recent antibiotic use, prior exposure to hepatitis. She has no complaint of right upper quadrant pain, jaundice, confusion, dark urine, fevers, chills. Last colonoscopy 2017, no polyps, 5-year recall advised. Patient stated no reported fevers, chills, nausea, vomiting, hematemesis, odynophagia, dysphagia, heartburn, regurgitation, SOB, CP, abdominal pain, changes in bowel movement with diarrhea or constipation, melena, hematochezia, or unintentional weight loss. GI History:  Prior Colonoscopy: 2017, 5-year recall  Prior EGD:  No prior EGD  Family hx:  No reported first degree relatives with colorectal cancer  Surgical hx: Cholecystectomy  Blood thinners: Denies antiplatelet or anticoagulation use  NSAID use: Occasional NSAID use for migraines  DM meds: None  Social Hx: No regular ETOH, smoking, or drug use.           Historical Information   Past Medical History:   Diagnosis Date   • Arthritis    • Fatty liver    • Fibromyalgia    • Mood swings    • Thyroid activity decreased      Past Surgical History: Procedure Laterality Date   • BREAST BIOPSY Left     benign   • CHOLECYSTECTOMY     • COLONOSCOPY      2017   • HYSTERECTOMY     • OOPHORECTOMY Right      Social History   Social History     Substance and Sexual Activity   Alcohol Use Yes    Comment: 2-3 year     Social History     Substance and Sexual Activity   Drug Use Never     Social History     Tobacco Use   Smoking Status Former   • Types: Cigarettes   • Quit date: 1988   • Years since quittin.1   Smokeless Tobacco Never     Family History   Problem Relation Age of Onset   • Diabetes Father    • Prostate cancer Father    • Diabetes Other    • Breast cancer Mother    • Breast cancer Sister 61   • Bone cancer Maternal Grandmother    • Cancer Paternal Aunt        Meds/Allergies     No current facility-administered medications for this visit. (Not in a hospital admission)      Allergies   Allergen Reactions   • Cefuroxime Hives   • Celecoxib Other (See Comments)   • Clindamycin Other (See Comments)   • Duloxetine Hcl Other (See Comments)   • Gadolinium Derivatives Hives     Hives after MRI Brain at [address] 2018 per patient   • Iodinated Contrast Media Itching and Swelling   • Penicillins Other (See Comments)     Unknown         PHYSICAL EXAM    Visit Vitals  /82   Ht 5' 7" (1.702 m)   Wt 114 kg (251 lb)   BMI 39.31 kg/m²   OB Status Hysterectomy   Smoking Status Former   BSA 2.23 m²     Body mass index is 39.31 kg/m².   General Appearance: NAD, cooperative, alert  Eyes: Anicteric, EOMI  ENT: Normocephalic, atraumatic, normal mucosa  Neck: symmetrical, trachea midline  Lungs: clear to auscultation, non-labored respirations on room air, no wheezing  CV: S1 S2+ radial pulses bilaterally  GI:  Soft, non-tender, non-distended; normal bowel sounds; no masses, no organomegaly   Rectal: Deferred  Musculoskeletal: No gross deformities or pitting edema  Skin:  No jaundice, no rashes  Neurologic: awake, alert, oriented, no gross deficits    Lab Results Component Value Date    GLUCOSE 96 08/07/2014    CALCIUM 9.3 04/14/2021     08/07/2014    K 3.8 04/14/2021    CO2 30 04/14/2021     04/14/2021    BUN 12 04/14/2021    CREATININE 0.84 04/14/2021    CREATININE 0.72 03/29/2021    CREATININE 0.67 08/07/2014     Lab Results   Component Value Date    WBC 8.28 04/14/2021    WBC 5.27 03/29/2021    WBC 3.87 (L) 08/07/2014    HGB 14.5 04/14/2021    HGB 14.1 03/29/2021    HGB 13.3 08/07/2014    MCV 87 04/14/2021     04/14/2021     03/29/2021     08/07/2014     Lab Results   Component Value Date    ALT 35 04/14/2021    ALT 33 03/29/2021    ALT 28 08/07/2014    AST 19 04/14/2021    AST 9 03/29/2021    AST 13 08/07/2014    ALKPHOS 103 04/14/2021    ALKPHOS 76 03/29/2021    ALKPHOS 90 08/07/2014    TBILI 0.60 04/14/2021    TBILI 0.56 03/29/2021     No results found for: "AMYLASE"  Lab Results   Component Value Date    LIPASE 95 04/14/2021     No results found for: "IRON", "TIBC", "FERRITIN"  No results found for: "INR"    US abdomen limited    Result Date: 8/9/2023  Narrative: RIGHT UPPER QUADRANT ULTRASOUND INDICATION:    R79.89: Other specified abnormal findings of blood chemistry. COMPARISON: February 7, 2022 TECHNIQUE:   Real-time ultrasound of the right upper quadrant was performed with a curvilinear transducer with both volumetric sweeps and still imaging techniques. FINDINGS: PANCREAS:  Portions of the pancreas are obscured by bowel gas. Visualized portions of the pancreas are unremarkable. AORTA AND IVC:  Visualized portions are normal for patient age. LIVER: Size:  Within normal range. The liver measures 16.1 cm in the midclavicular line. Contour:  Surface contour is smooth. Parenchyma: There is marked diffuse increased echogenicity with smooth echotexture and significant beam attenuation with loss of periportal echogenicity. Most consistent with severe hepatic steatosis.  Areas of focal fatty sparing Limited imaging of the main portal vein shows it to be patent and hepatopetal. No liver mass identified. BILIARY: Patient has undergone cholecystectomy. No intrahepatic biliary dilatation. CBD measures 3.0 mm. No choledocholithiasis. KIDNEY: Right kidney measures 10.2 SAG x 4.7 AP x 4.9 TRV cm. Volume 121.6 mL Kidney within normal limits. ASCITES:   None. Impression: Severe hepatic steatosis with areas of focal fatty sparing. Previously visualized hepatic lesions are not appreciated on this study Workstation performed: [id number]       Imaging Studies: I have personally reviewed pertinent reports. Pathology, and Other Studies: I have personally reviewed pertinent reports.       REVIEW OF SYSTEMS    CONSTITUTIONAL: negative unless stated in HPI  GASTROINTESTINAL: As noted in the HPI

## 2023-09-01 NOTE — PATIENT INSTRUCTIONS
1.  We will perform additional work-up to ensure there is no other reason for the fatty liver seen on imaging  2. As long as the above work-up does not show anything else to treat, we will plan to pursue lifestyle modifications with goal total body weight loss of 5 to 10% over 1 year. I will provide referral for nutrition.   3.  Lets plan to see you back in 6 months to review your progress and also discuss colonoscopy once the above work-up is done

## 2023-09-02 LAB
ANA SER QL IA: NEGATIVE
HBV CORE AB SER QL: NORMAL
HBV SURFACE AB SER-ACNC: 32.7 MIU/ML
HBV SURFACE AG SER QL: NORMAL
HCV AB SER QL: NORMAL

## 2023-09-05 LAB
ACTIN IGG SERPL-ACNC: 24 UNITS (ref 0–19)
MITOCHONDRIA M2 IGG SER-ACNC: <20 UNITS (ref 0–20)

## 2023-09-20 ENCOUNTER — OFFICE VISIT (OUTPATIENT)
Dept: OBGYN CLINIC | Facility: CLINIC | Age: 68
End: 2023-09-20
Payer: MEDICARE

## 2023-09-20 ENCOUNTER — TELEPHONE (OUTPATIENT)
Age: 68
End: 2023-09-20

## 2023-09-20 VITALS
DIASTOLIC BLOOD PRESSURE: 70 MMHG | BODY MASS INDEX: 39.55 KG/M2 | HEIGHT: 67 IN | WEIGHT: 252 LBS | SYSTOLIC BLOOD PRESSURE: 120 MMHG

## 2023-09-20 DIAGNOSIS — S80.01XD CONTUSION OF RIGHT KNEE, SUBSEQUENT ENCOUNTER: Primary | ICD-10-CM

## 2023-09-20 PROCEDURE — 99213 OFFICE O/P EST LOW 20 MIN: CPT | Performed by: ORTHOPAEDIC SURGERY

## 2023-09-20 NOTE — ASSESSMENT & PLAN NOTE
Findings consistent with right knee contusion with hematoma- improved. Findings and treatment options were discussed with the patient. She is doing very well. Discussed that it will take more time for the rest of the swelling to resolve. She might wind up with a small bump in that area, but it will not affect her function. She has no restrictions. Follow-up as needed if any problems arise. All patient's questions were answered to her satisfaction. This note is created using dictation transcription. It may contain typographical errors, grammatical errors, improperly dictated words, background noise and other errors.

## 2023-09-20 NOTE — PROGRESS NOTES
Assessment:     1. Contusion of right knee, subsequent encounter        Plan:     Problem List Items Addressed This Visit        Other    Contusion of right knee - Primary     Findings consistent with right knee contusion with hematoma- improved. Findings and treatment options were discussed with the patient. She is doing very well. Discussed that it will take more time for the rest of the swelling to resolve. She might wind up with a small bump in that area, but it will not affect her function. She has no restrictions. Follow-up as needed if any problems arise. All patient's questions were answered to her satisfaction. This note is created using dictation transcription. It may contain typographical errors, grammatical errors, improperly dictated words, background noise and other errors. Subjective:     Patient ID: Mook Delgado is a 78 y.o. female. Chief Complaint: This is a 61-year-old white female following up for a right knee contusion and hematoma. Injury occurred about 7 weeks ago where she fell landing on carpet directly on the right knee. Swelling and bruising have improved significantly since last visit. She denies any pain in the area. She states she does feel some fatigue of that leg with prolonged walking and standing. Otherwise she is doing her normal activities.     Allergy:  Allergies   Allergen Reactions   • Cefuroxime Hives   • Celecoxib Other (See Comments)   • Clindamycin Other (See Comments)   • Duloxetine Hcl Other (See Comments)   • Gadolinium Derivatives Hives     Hives after MRI Brain at [address] 2018 per patient   • Iodinated Contrast Media Itching and Swelling   • Penicillins Other (See Comments)     Unknown       Medications:  all current active meds have been reviewed  Past Medical History:  Past Medical History:   Diagnosis Date   • Arthritis    • Fatty liver    • Fibromyalgia    • Mood swings    • Thyroid activity decreased      Past Surgical History:  Past Surgical History:   Procedure Laterality Date   • BREAST BIOPSY Left     benign   • CHOLECYSTECTOMY     • COLONOSCOPY      2017   • HYSTERECTOMY     • OOPHORECTOMY Right      Family History:  Family History   Problem Relation Age of Onset   • Diabetes Father    • Prostate cancer Father    • Diabetes Other    • Breast cancer Mother    • Breast cancer Sister 61   • Bone cancer Maternal Grandmother    • Cancer Paternal Aunt      Social History:  Social History     Substance and Sexual Activity   Alcohol Use Yes    Comment: 2-3 year     Social History     Substance and Sexual Activity   Drug Use Never     Social History     Tobacco Use   Smoking Status Former   • Types: Cigarettes   • Quit date: 1988   • Years since quittin.1   Smokeless Tobacco Never     Review of Systems   Constitutional: Negative for chills, fever and unexpected weight change. HENT: Negative for hearing loss, nosebleeds and sore throat. Eyes: Negative for pain, redness and visual disturbance. Respiratory: Negative for cough, shortness of breath and wheezing. Cardiovascular: Negative for chest pain, palpitations and leg swelling. Gastrointestinal: Negative for abdominal pain, nausea and vomiting. Endocrine: Negative for polyphagia and polyuria. Genitourinary: Negative for dysuria and hematuria. Musculoskeletal: Positive for joint swelling (Right knee). Negative for arthralgias (Right knee) and gait problem. Skin: Negative for rash and wound. Allergic/Immunologic: Negative. Neurological: Negative for dizziness, numbness and headaches. Psychiatric/Behavioral: Negative for confusion and suicidal ideas. The patient is not nervous/anxious. Objective:  BP Readings from Last 1 Encounters:   23 120/70      Wt Readings from Last 1 Encounters:   23 114 kg (252 lb)      BMI:   Estimated body mass index is 39.47 kg/m² as calculated from the following:    Height as of this encounter: 5' 7" (1.702 m).     Weight as of this encounter: 114 kg (252 lb). BSA:   Estimated body surface area is 2.23 meters squared as calculated from the following:    Height as of this encounter: 5' 7" (1.702 m). Weight as of this encounter: 114 kg (252 lb). Physical Exam  Vitals reviewed. Constitutional:       Appearance: She is well-developed. She is obese. HENT:      Head: Normocephalic and atraumatic. Right Ear: External ear normal.      Left Ear: External ear normal.   Eyes:      General:         Right eye: No discharge. Left eye: No discharge. Extraocular Movements: Extraocular movements intact. Conjunctiva/sclera: Conjunctivae normal.   Pulmonary:      Effort: Pulmonary effort is normal. No respiratory distress. Musculoskeletal:      Cervical back: Neck supple. Right knee:      Instability Tests: Medial Aranza test negative and lateral Aranza test negative. Skin:     General: Skin is warm and dry. Neurological:      General: No focal deficit present. Mental Status: She is alert and oriented to person, place, and time. Psychiatric:         Mood and Affect: Mood normal.         Behavior: Behavior normal.       Right Knee Exam     Tenderness   The patient is experiencing no tenderness. Range of Motion   The patient has normal right knee ROM. Tests   Aranza:  Medial - negative Lateral - negative  Varus: negative Valgus: negative  Patellar apprehension: negative    Other   Erythema: absent  Sensation: normal  Pulse: present  Swelling: mild    Comments:  Soft tissue swelling improved significantly since last visit  Healed abrasion over anterior aspect of knee  No pain with resisted extension              No new imaging.      Scribe Attestation    I,:  Tiffani Josue PA-C am acting as a scribe while in the presence of the attending physician.:       I,:  Greta Baum MD personally performed the services described in this documentation    as scribed in my presence.:

## 2023-09-20 NOTE — TELEPHONE ENCOUNTER
Patients GI provider:  Dr. Segundo Ruiz    Number to return call: [phone]    Reason for call: Pt calling to have the US ELASTOGRAPHY/UGAP order sent over to the [facility] in [address] [place] [address]. Pt has an appt tomorrow at 8:30am  Scheduled procedure/appointment date if applicable: Apt/procedure 02/2/24

## 2023-09-21 ENCOUNTER — HOSPITAL ENCOUNTER (OUTPATIENT)
Dept: ULTRASOUND IMAGING | Facility: HOSPITAL | Age: 68
End: 2023-09-21
Attending: STUDENT IN AN ORGANIZED HEALTH CARE EDUCATION/TRAINING PROGRAM
Payer: MEDICARE

## 2023-09-21 DIAGNOSIS — K76.0 FATTY LIVER DISEASE, NONALCOHOLIC: ICD-10-CM

## 2023-09-21 PROCEDURE — 76981 USE PARENCHYMA: CPT

## 2023-09-25 ENCOUNTER — OFFICE VISIT (OUTPATIENT)
Dept: URGENT CARE | Facility: CLINIC | Age: 68
End: 2023-09-25
Payer: MEDICARE

## 2023-09-25 VITALS
WEIGHT: 252 LBS | RESPIRATION RATE: 18 BRPM | DIASTOLIC BLOOD PRESSURE: 80 MMHG | HEIGHT: 67 IN | SYSTOLIC BLOOD PRESSURE: 132 MMHG | OXYGEN SATURATION: 97 % | BODY MASS INDEX: 39.55 KG/M2 | TEMPERATURE: 100 F | HEART RATE: 64 BPM

## 2023-09-25 DIAGNOSIS — R30.0 DYSURIA: ICD-10-CM

## 2023-09-25 DIAGNOSIS — N30.01 ACUTE CYSTITIS WITH HEMATURIA: Primary | ICD-10-CM

## 2023-09-25 LAB
SL AMB  POCT GLUCOSE, UA: NEGATIVE
SL AMB LEUKOCYTE ESTERASE,UA: ABNORMAL
SL AMB POCT BILIRUBIN,UA: NEGATIVE
SL AMB POCT BLOOD,UA: ABNORMAL
SL AMB POCT CLARITY,UA: ABNORMAL
SL AMB POCT COLOR,UA: YELLOW
SL AMB POCT KETONES,UA: NEGATIVE
SL AMB POCT NITRITE,UA: POSITIVE
SL AMB POCT PH,UA: 7.5
SL AMB POCT SPECIFIC GRAVITY,UA: 1.01
SL AMB POCT URINE PROTEIN: 100
SL AMB POCT UROBILINOGEN: 0.2

## 2023-09-25 PROCEDURE — 81002 URINALYSIS NONAUTO W/O SCOPE: CPT | Performed by: PHYSICIAN ASSISTANT

## 2023-09-25 PROCEDURE — G0463 HOSPITAL OUTPT CLINIC VISIT: HCPCS | Performed by: PHYSICIAN ASSISTANT

## 2023-09-25 PROCEDURE — 99213 OFFICE O/P EST LOW 20 MIN: CPT | Performed by: PHYSICIAN ASSISTANT

## 2023-09-25 RX ORDER — NITROFURANTOIN 25; 75 MG/1; MG/1
100 CAPSULE ORAL 2 TIMES DAILY
Qty: 10 CAPSULE | Refills: 0 | Status: SHIPPED | OUTPATIENT
Start: 2023-09-25 | End: 2023-09-30

## 2023-09-25 NOTE — PROGRESS NOTES
[facility]        NAME: Lewis Quinn is a 78 y.o. female  : 1955    MRN: [id number]  DATE: 2023  TIME: 3:01 PM    Assessment and Plan   Acute cystitis with hematuria [N30.01]  1. Acute cystitis with hematuria        2. Dysuria  POCT urine dip    Urine culture            Patient Instructions     Take antibiotics as directed  Continue to encourage fluids   Follow up with PCP in 3-5 days. Proceed to  ER if symptoms worsen. Chief Complaint     Chief Complaint   Patient presents with   • Possible UTI     Pt reports dysuria and urinary frequency with onset of symptoms on Friday. Managed at home with Azo. Denies any fever. History of Present Illness       Urinary Tract Infection   This is a new problem. Episode onset: Friday. The problem occurs every urination. The problem has been unchanged. The quality of the pain is described as burning. There has been no fever. There is no history of pyelonephritis. Associated symptoms include chills, a discharge, flank pain and frequency. Pertinent negatives include no hematuria, urgency or vomiting. Treatments tried: azo. The treatment provided mild relief. Her past medical history is significant for recurrent UTIs. Review of Systems   Review of Systems   Constitutional: Positive for chills. Negative for fever. HENT: Negative for ear pain and sore throat. Eyes: Negative for pain and visual disturbance. Respiratory: Negative for cough and shortness of breath. Cardiovascular: Negative for chest pain and palpitations. Gastrointestinal: Negative for abdominal pain and vomiting. Genitourinary: Positive for flank pain and frequency. Negative for dysuria, hematuria and urgency. Musculoskeletal: Negative for arthralgias and back pain. Skin: Negative for color change and rash. Neurological: Negative for seizures and syncope. All other systems reviewed and are negative.         Current Medications       Current Outpatient Medications:   •  atenolol (TENORMIN) 25 mg tablet, , Disp: , Rfl:   •  gabapentin (NEURONTIN) 300 mg capsule, Take 300 mg by mouth 2 (two) times a day, Disp: , Rfl:   •  ibuprofen (MOTRIN) 200 mg tablet, Take 2 tablets by mouth every 6 (six) hours as needed, Disp: , Rfl:   •  levothyroxine 137 mcg tablet, TAKE 1 TABLET BY MOUTH ONCE DAILY IN THE MORNING ON AN EMPTY STOMACH, Disp: , Rfl:   •  triamcinolone (KENALOG) 0.1 % cream, , Disp: , Rfl:   •  venlafaxine (EFFEXOR) 75 mg tablet, Take 75 mg by mouth in the morning, Disp: , Rfl:   •  dicyclomine (BENTYL) 10 mg capsule, Take 10 mg by mouth 4 (four) times a day (before meals and at bedtime) (Patient not taking: Reported on 9/1/2023), Disp: , Rfl:   •  dicyclomine (BENTYL) 20 mg tablet, Take 1 tablet (20 mg total) by mouth 2 (two) times a day (Patient not taking: Reported on 9/1/2023), Disp: 20 tablet, Rfl: 0  •  levothyroxine 175 mcg tablet, Take 137 mcg by mouth daily (Patient not taking: Reported on 9/1/2023), Disp: , Rfl:   •  metaxalone (SKELAXIN) 800 mg tablet, Take 800 mg by mouth 3 (three) times a day (Patient not taking: Reported on 9/1/2023), Disp: , Rfl:   •  methylPREDNISolone 4 MG tablet therapy pack, Use as directed on package (Patient not taking: Reported on 9/1/2023), Disp: 21 tablet, Rfl: 0    Current Allergies     Allergies as of 09/25/2023 - Reviewed 09/25/2023   Allergen Reaction Noted   • Cefuroxime Hives 11/20/2021   • Celecoxib Other (See Comments) 11/20/2021   • Clindamycin Other (See Comments) 11/20/2021   • Duloxetine hcl Other (See Comments) 11/20/2021   • Gadolinium derivatives Hives 04/14/2021   • Iodinated contrast media Itching and Swelling 12/26/2018   • Penicillins Other (See Comments) 04/14/2021            The following portions of the patient's history were reviewed and updated as appropriate: allergies, current medications, past family history, past medical history, past social history, past surgical history and problem list.     Past Medical History:   Diagnosis Date   • Arthritis    • Fatty liver    • Fibromyalgia    • Mood swings    • Thyroid activity decreased        Past Surgical History:   Procedure Laterality Date   • BREAST BIOPSY Left     benign   • CHOLECYSTECTOMY     • COLONOSCOPY      2017   • HYSTERECTOMY     • OOPHORECTOMY Right        Family History   Problem Relation Age of Onset   • Diabetes Father    • Prostate cancer Father    • Diabetes Other    • Breast cancer Mother    • Breast cancer Sister 61   • Bone cancer Maternal Grandmother    • Cancer Paternal Aunt          Medications have been verified. Objective   /80 (BP Location: Right arm, Patient Position: Sitting)   Pulse 64   Temp 100 °F (37.8 °C)   Resp 18   Ht 5' 7" (1.702 m)   Wt 114 kg (252 lb)   SpO2 97%   BMI 39.47 kg/m²   No LMP recorded. Patient has had a hysterectomy. Physical Exam     Physical Exam  Vitals and nursing note reviewed. Constitutional:       General: She is not in acute distress. Appearance: Normal appearance. HENT:      Head: Normocephalic and atraumatic. Cardiovascular:      Rate and Rhythm: Normal rate and regular rhythm. Pulses: Normal pulses. Heart sounds: Normal heart sounds. Pulmonary:      Effort: Pulmonary effort is normal.      Breath sounds: Normal breath sounds. Abdominal:      Tenderness: There is no right CVA tenderness or left CVA tenderness. Skin:     General: Skin is warm and dry. Neurological:      Mental Status: She is alert and oriented to person, place, and time.    Psychiatric:         Mood and Affect: Mood normal.         Behavior: Behavior normal.       UA:  Leuk: large  Nitrite: pos  Protein: 100  Blood: mod

## 2023-09-25 NOTE — PATIENT INSTRUCTIONS
Take antibiotics as directed  Continue to encourage fluids   Follow up with PCP in 3-5 days. Proceed to  ER if symptoms worsen.

## 2023-09-29 LAB
BACTERIA UR CULT: ABNORMAL
Lab: ABNORMAL
SL AMB ANTIMICROBIAL SUSCEPTIBILITY: ABNORMAL

## 2023-10-30 ENCOUNTER — OFFICE VISIT (OUTPATIENT)
Dept: FAMILY MEDICINE CLINIC | Facility: CLINIC | Age: 68
End: 2023-10-30
Payer: MEDICARE

## 2023-10-30 VITALS
WEIGHT: 249.8 LBS | DIASTOLIC BLOOD PRESSURE: 88 MMHG | SYSTOLIC BLOOD PRESSURE: 118 MMHG | RESPIRATION RATE: 18 BRPM | TEMPERATURE: 98 F | HEART RATE: 69 BPM | HEIGHT: 67 IN | BODY MASS INDEX: 39.21 KG/M2 | OXYGEN SATURATION: 97 %

## 2023-10-30 DIAGNOSIS — N39.0 FREQUENT UTI: ICD-10-CM

## 2023-10-30 DIAGNOSIS — N89.8 VAGINAL DRYNESS: ICD-10-CM

## 2023-10-30 DIAGNOSIS — K21.9 GASTROESOPHAGEAL REFLUX DISEASE WITHOUT ESOPHAGITIS: ICD-10-CM

## 2023-10-30 DIAGNOSIS — Z23 NEED FOR INFLUENZA VACCINATION: ICD-10-CM

## 2023-10-30 DIAGNOSIS — E03.9 HYPOTHYROIDISM, UNSPECIFIED TYPE: Primary | ICD-10-CM

## 2023-10-30 PROBLEM — F32.A DEPRESSION: Status: ACTIVE | Noted: 2019-01-08

## 2023-10-30 PROBLEM — E66.812 CLASS 2 OBESITY DUE TO EXCESS CALORIES WITHOUT SERIOUS COMORBIDITY IN ADULT: Status: ACTIVE | Noted: 2019-10-10

## 2023-10-30 PROBLEM — K08.89 OTHER SPECIFIED DISORDERS OF TEETH AND SUPPORTING STRUCTURES: Status: ACTIVE | Noted: 2023-10-30

## 2023-10-30 PROBLEM — K76.0 FATTY LIVER: Status: ACTIVE | Noted: 2019-04-09

## 2023-10-30 PROBLEM — E66.09 CLASS 2 OBESITY DUE TO EXCESS CALORIES WITHOUT SERIOUS COMORBIDITY IN ADULT: Status: ACTIVE | Noted: 2019-10-10

## 2023-10-30 PROBLEM — G91.2 NORMAL PRESSURE HYDROCEPHALUS (HCC): Status: ACTIVE | Noted: 2023-10-30

## 2023-10-30 PROBLEM — I71.21 THORACIC ASCENDING AORTIC ANEURYSM (HCC): Status: ACTIVE | Noted: 2019-04-09

## 2023-10-30 PROBLEM — R91.8 PULMONARY NODULES: Status: ACTIVE | Noted: 2018-12-26

## 2023-10-30 PROBLEM — F33.1 MODERATE RECURRENT MAJOR DEPRESSION (HCC): Status: ACTIVE | Noted: 2023-10-30

## 2023-10-30 PROBLEM — R41.3 MEMORY LOSS: Status: ACTIVE | Noted: 2019-10-27

## 2023-10-30 PROBLEM — M79.7 FIBROMYALGIA: Status: ACTIVE | Noted: 2019-01-08

## 2023-10-30 PROBLEM — F41.9 ANXIETY DISORDER: Status: ACTIVE | Noted: 2023-10-30

## 2023-10-30 PROBLEM — I77.819 DILATATION OF AORTA (HCC): Status: ACTIVE | Noted: 2023-10-30

## 2023-10-30 PROBLEM — M54.2 NECK PAIN: Status: ACTIVE | Noted: 2019-06-06

## 2023-10-30 PROCEDURE — 90662 IIV NO PRSV INCREASED AG IM: CPT

## 2023-10-30 PROCEDURE — 99204 OFFICE O/P NEW MOD 45 MIN: CPT | Performed by: FAMILY MEDICINE

## 2023-10-30 PROCEDURE — G0008 ADMIN INFLUENZA VIRUS VAC: HCPCS

## 2023-10-30 RX ORDER — PANTOPRAZOLE SODIUM 40 MG/1
40 TABLET, DELAYED RELEASE ORAL DAILY
Qty: 30 TABLET | Refills: 0 | Status: SHIPPED | OUTPATIENT
Start: 2023-10-30

## 2023-10-30 RX ORDER — MOMETASONE FUROATE 1 MG/G
OINTMENT TOPICAL DAILY
COMMUNITY

## 2023-10-30 RX ORDER — LEVOTHYROXINE SODIUM 0.15 MG/1
150 TABLET ORAL
Qty: 30 TABLET | Refills: 5 | Status: SHIPPED | OUTPATIENT
Start: 2023-10-30

## 2023-10-30 RX ORDER — VENLAFAXINE HYDROCHLORIDE 150 MG/1
CAPSULE, EXTENDED RELEASE ORAL
COMMUNITY
Start: 2023-08-29

## 2023-10-30 NOTE — PROGRESS NOTES
Assessment/Plan:     Diagnoses and all orders for this visit:    Hypothyroidism, unspecified type  -     levothyroxine (Euthyrox) 150 mcg tablet; Take 1 tablet (150 mcg total) by mouth daily in the early morning  -     TSH, 3rd generation; Future  -     TSH, 3rd generation    Frequent UTI    Vaginal dryness  -     Ambulatory Referral to Obstetrics / Gynecology; Future    Need for influenza vaccination  -     influenza vaccine, high-dose, PF 0.7 mL (FLUZONE HIGH-DOSE)    Gastroesophageal reflux disease without esophagitis  -     pantoprazole (PROTONIX) 40 mg tablet; Take 1 tablet (40 mg total) by mouth daily    Other orders  -     venlafaxine (EFFEXOR-XR) 150 mg 24 hr capsule  -     mometasone (ELOCON) 0.1 % ointment; Apply topically daily     We will increase levothyroxine from 137 to 150 mcg  TSH ordered for 8 to 10 weeks from now patient referred to GYN for her vaginal dryness issue  Flu shot given  Pantoprazole ordered up with GERD symptoms  She can follow-up with me in 3 to 6 months or sooner if needed      Subjective:     Cc: New patient to establish     Patient ID: Lianet Larsen is a 78 y.o. female. Patient presents today to establish  She has been having significant  She was taking a steroid cream which is causing frequent UTIs so she stopped this  Patient is also having problems losing weight  She would like her thyroid medicine increased        The following portions of the patient's history were reviewed and updated as appropriate: allergies, current medications, past family history, past medical history, past social history, past surgical history and problem list.    Review of Systems   Constitutional: Negative. HENT:  Positive for congestion. Eyes: Negative. Respiratory: Negative. Cardiovascular: Negative. Gastrointestinal: Negative. Endocrine: Negative. Genitourinary:  Positive for vaginal pain. Musculoskeletal: Negative. Skin: Negative.     Allergic/Immunologic: Negative. Neurological: Negative. Hematological: Negative. Psychiatric/Behavioral: Negative. All other systems reviewed and are negative. Objective:    Vitals:    10/30/23 1433   BP: 118/88   BP Location: Left arm   Patient Position: Sitting   Cuff Size: Adult   Pulse: 69   Resp: 18   Temp: 98 °F (36.7 °C)   TempSrc: Tympanic   SpO2: 97%   Weight: 113 kg (249 lb 12.8 oz)   Height: 5' 7" (1.702 m)          Physical Exam  Vitals and nursing note reviewed. Constitutional:       Appearance: Normal appearance. She is well-developed. HENT:      Head: Normocephalic and atraumatic. Right Ear: External ear normal.      Left Ear: External ear normal.   Eyes:      Conjunctiva/sclera: Conjunctivae normal.      Pupils: Pupils are equal, round, and reactive to light. Cardiovascular:      Rate and Rhythm: Normal rate and regular rhythm. Heart sounds: Normal heart sounds. Pulmonary:      Effort: Pulmonary effort is normal.      Breath sounds: Normal breath sounds. Abdominal:      General: Bowel sounds are normal.      Palpations: Abdomen is soft. Musculoskeletal:         General: Normal range of motion. Cervical back: Normal range of motion. Skin:     General: Skin is warm and dry. Neurological:      General: No focal deficit present. Mental Status: She is alert and oriented to person, place, and time. Deep Tendon Reflexes: Reflexes are normal and symmetric. Psychiatric:         Behavior: Behavior normal.         Thought Content: Thought content normal.         Judgment: Judgment normal.       BMI Counseling: Body mass index is 39.12 kg/m².  The BMI is above normal. Nutrition recommendations include reducing portion sizes, decreasing overall calorie intake, 3-5 servings of fruits/vegetables daily, reducing fast food intake, consuming healthier snacks, decreasing soda and/or juice intake, moderation in carbohydrate intake, increasing intake of lean protein, reducing intake of saturated fat and trans fat, and reducing intake of cholesterol. Exercise recommendations include exercising 3-5 times per week.

## 2023-10-31 ENCOUNTER — TELEPHONE (OUTPATIENT)
Dept: ADMINISTRATIVE | Facility: OTHER | Age: 68
End: 2023-10-31

## 2023-10-31 NOTE — TELEPHONE ENCOUNTER
----- Message from Martina Short DO sent at 10/30/2023  2:43 PM EDT -----  Regarding: care gap request  10/30/23 2:43 PM    Hello, our patient attached above has had Medicare AWV completed/performed. Please assist in updating the patient chart by pulling the document from the Media Tab. The date of service is 9/20/23.      Thank you,  [facility]

## 2023-11-28 ENCOUNTER — OFFICE VISIT (OUTPATIENT)
Dept: FAMILY MEDICINE CLINIC | Facility: CLINIC | Age: 68
End: 2023-11-28
Payer: MEDICARE

## 2023-11-28 VITALS
OXYGEN SATURATION: 96 % | HEIGHT: 67 IN | BODY MASS INDEX: 39.43 KG/M2 | RESPIRATION RATE: 18 BRPM | DIASTOLIC BLOOD PRESSURE: 80 MMHG | TEMPERATURE: 97.8 F | HEART RATE: 86 BPM | WEIGHT: 251.2 LBS | SYSTOLIC BLOOD PRESSURE: 120 MMHG

## 2023-11-28 DIAGNOSIS — J34.89 SINUS PRESSURE: Primary | ICD-10-CM

## 2023-11-28 DIAGNOSIS — J30.2 SEASONAL ALLERGIES: ICD-10-CM

## 2023-11-28 PROCEDURE — 99213 OFFICE O/P EST LOW 20 MIN: CPT | Performed by: PHYSICIAN ASSISTANT

## 2023-11-28 RX ORDER — FLUTICASONE PROPIONATE 50 MCG
2 SPRAY, SUSPENSION (ML) NASAL DAILY
Qty: 16 G | Refills: 5 | Status: SHIPPED | OUTPATIENT
Start: 2023-11-28

## 2023-11-28 NOTE — PROGRESS NOTES
Name: Andrea Land      : 1955      MRN: [id number]  Encounter Provider: Obey Ramirez PA-C  Encounter Date: 2023   Encounter department: [facility]    Assessment & Plan     1. Sinus pressure  -     fluticasone (FLONASE) 50 mcg/act nasal spray; 2 sprays into each nostril daily    2. Seasonal allergies  -     fluticasone (FLONASE) 50 mcg/act nasal spray; 2 sprays into each nostril daily    Patient did not wish for p.o. steroids and states would like to try the Flonase. Patient states will call with any questions, concerns, persistent or worsening symptoms. Subjective      HPI  81-year-old female here today for sick appointment. Patient complaining of sinus pressure, nasal congestion, dizziness and headaches. Patient denies any fevers or chills. Denies any sore throat, dyspnea, cough, chest pain, abdominal pain or GI symptoms. Patient states does have history of sinus issues as well as seasonal allergies. Discussed that this is likely viral in nature since she is only had symptoms for approximately 3 days. Patient denies any pain but states does have sinus pressure. Also on examination patient does have some fluid behind her Tms. Did discuss possible prednisone however patient states does not like the side effects and interrupts with her sleep. Patient is agreeable to Flonase.   Review of Systems  As per HPI  Current Outpatient Medications on File Prior to Visit   Medication Sig    atenolol (TENORMIN) 25 mg tablet     gabapentin (NEURONTIN) 300 mg capsule Take 300 mg by mouth 2 (two) times a day    ibuprofen (MOTRIN) 200 mg tablet Take 2 tablets by mouth every 6 (six) hours as needed    levothyroxine (Euthyrox) 150 mcg tablet Take 1 tablet (150 mcg total) by mouth daily in the early morning    mometasone (ELOCON) 0.1 % ointment Apply topically daily    pantoprazole (PROTONIX) 40 mg tablet Take 1 tablet (40 mg total) by mouth daily    venlafaxine (EFFEXOR-XR) 150 mg 24 hr capsule        Objective     /80 (BP Location: Left arm, Patient Position: Sitting, Cuff Size: Standard)   Pulse 86   Temp 97.8 °F (36.6 °C) (Tympanic)   Resp 18   Ht 5' 7" (1.702 m)   Wt 114 kg (251 lb 3.2 oz)   SpO2 96%   BMI 39.34 kg/m²     Physical Exam  Vitals and nursing note reviewed. Constitutional:       General: She is not in acute distress. Appearance: Normal appearance. She is not ill-appearing, toxic-appearing or diaphoretic. HENT:      Nose: Congestion present. Comments: Facial pressure maxillary and frontal regions     Mouth/Throat:      Mouth: Mucous membranes are moist.      Pharynx: Oropharynx is clear. Eyes:      General: No scleral icterus. Conjunctiva/sclera: Conjunctivae normal.   Cardiovascular:      Rate and Rhythm: Normal rate and regular rhythm. Heart sounds: Normal heart sounds. Pulmonary:      Effort: Pulmonary effort is normal.      Breath sounds: Normal breath sounds. Abdominal:      Palpations: Abdomen is soft. Tenderness: There is no abdominal tenderness. Musculoskeletal:      Cervical back: Neck supple. Right lower leg: No edema. Left lower leg: No edema. Lymphadenopathy:      Cervical: No cervical adenopathy. Neurological:      General: No focal deficit present. Mental Status: She is alert and oriented to person, place, and time.    Psychiatric:         Mood and Affect: Mood normal.       Nathan Pinon PA-C

## 2023-12-27 ENCOUNTER — TELEPHONE (OUTPATIENT)
Dept: OBGYN CLINIC | Facility: CLINIC | Age: 68
End: 2023-12-27

## 2024-01-16 DIAGNOSIS — M54.2 NECK PAIN: Primary | ICD-10-CM

## 2024-01-16 RX ORDER — GABAPENTIN 300 MG/1
300 CAPSULE ORAL 2 TIMES DAILY
Qty: 60 CAPSULE | Refills: 5 | Status: SHIPPED | OUTPATIENT
Start: 2024-01-16

## 2024-01-22 ENCOUNTER — OFFICE VISIT (OUTPATIENT)
Dept: URGENT CARE | Facility: CLINIC | Age: 69
End: 2024-01-22
Payer: MEDICARE

## 2024-01-22 VITALS
RESPIRATION RATE: 16 BRPM | BODY MASS INDEX: 38.92 KG/M2 | DIASTOLIC BLOOD PRESSURE: 80 MMHG | HEIGHT: 67 IN | SYSTOLIC BLOOD PRESSURE: 132 MMHG | HEART RATE: 67 BPM | TEMPERATURE: 99.4 F | OXYGEN SATURATION: 96 % | WEIGHT: 248 LBS

## 2024-01-22 DIAGNOSIS — J40 BRONCHITIS: ICD-10-CM

## 2024-01-22 DIAGNOSIS — J01.00 ACUTE MAXILLARY SINUSITIS, RECURRENCE NOT SPECIFIED: Primary | ICD-10-CM

## 2024-01-22 DIAGNOSIS — Z20.822 ENCOUNTER FOR LABORATORY TESTING FOR COVID-19 VIRUS: ICD-10-CM

## 2024-01-22 LAB
SARS-COV-2 AG UPPER RESP QL IA: NEGATIVE
VALID CONTROL: NORMAL

## 2024-01-22 PROCEDURE — 99213 OFFICE O/P EST LOW 20 MIN: CPT | Performed by: PHYSICIAN ASSISTANT

## 2024-01-22 PROCEDURE — 87811 SARS-COV-2 COVID19 W/OPTIC: CPT | Performed by: PHYSICIAN ASSISTANT

## 2024-01-22 PROCEDURE — G0463 HOSPITAL OUTPT CLINIC VISIT: HCPCS | Performed by: PHYSICIAN ASSISTANT

## 2024-01-22 RX ORDER — DOXYCYCLINE 100 MG/1
100 TABLET ORAL 2 TIMES DAILY
Qty: 20 TABLET | Refills: 0 | Status: SHIPPED | OUTPATIENT
Start: 2024-01-22 | End: 2024-02-01

## 2024-01-22 RX ORDER — FLUTICASONE PROPIONATE 50 MCG
1 SPRAY, SUSPENSION (ML) NASAL DAILY
Qty: 9.9 ML | Refills: 0 | Status: SHIPPED | OUTPATIENT
Start: 2024-01-22

## 2024-01-22 RX ORDER — BENZONATATE 100 MG/1
100 CAPSULE ORAL 3 TIMES DAILY PRN
Qty: 20 CAPSULE | Refills: 0 | Status: SHIPPED | OUTPATIENT
Start: 2024-01-22

## 2024-01-22 NOTE — PROGRESS NOTES
"[facility]        NAME: Sarina Sheridan is a 68 y.o. female  : 1955    MRN: [id number]  DATE: 2024  TIME: 11:40 AM    /80 (BP Location: Left arm, Patient Position: Sitting)   Pulse 67   Temp 99.4 °F (37.4 °C)   Resp 16   Ht 5' 7\" (1.702 m)   Wt 112 kg (248 lb)   SpO2 96%   BMI 38.84 kg/m²     Assessment and Plan   Acute maxillary sinusitis, recurrence not specified [J01.00]  1. Acute maxillary sinusitis, recurrence not specified  Poct Covid 19 Rapid Antigen Test    doxycycline (ADOXA) 100 MG tablet    benzonatate (TESSALON PERLES) 100 mg capsule    fluticasone (FLONASE) 50 mcg/act nasal spray      2. Bronchitis  doxycycline (ADOXA) 100 MG tablet    benzonatate (TESSALON PERLES) 100 mg capsule    fluticasone (FLONASE) 50 mcg/act nasal spray      3. Encounter for laboratory testing for COVID-19 virus  doxycycline (ADOXA) 100 MG tablet    benzonatate (TESSALON PERLES) 100 mg capsule    fluticasone (FLONASE) 50 mcg/act nasal spray            Patient Instructions       Follow up with PCP in 3-5 days.  Proceed to  ER if symptoms worsen.    Chief Complaint     Chief Complaint   Patient presents with    Sore Throat     Pt reports sore throat with laryngitis, nasal congestion/discharge, and non-productive cough with onset one week ago. States progression of symptoms since. Managing with DayQuil. Denies testing at home for Covid. Positive RSV exposure in household.          History of Present Illness       Pt with productive cough and nasal congestion for about 1 week  no fevers     Sore Throat         Review of Systems   Review of Systems   Constitutional: Negative.    HENT:  Positive for sore throat.    Eyes: Negative.    Respiratory: Negative.     Cardiovascular: Negative.    Gastrointestinal: Negative.    Endocrine: Negative.    Genitourinary: Negative.    Musculoskeletal: Negative.    Skin: Negative.    Allergic/Immunologic: Negative.    Neurological: Negative.    Hematological: " Negative.    Psychiatric/Behavioral: Negative.     All other systems reviewed and are negative.        Current Medications       Current Outpatient Medications:     atenolol (TENORMIN) 25 mg tablet, , Disp: , Rfl:     benzonatate (TESSALON PERLES) 100 mg capsule, Take 1 capsule (100 mg total) by mouth 3 (three) times a day as needed for cough, Disp: 20 capsule, Rfl: 0    doxycycline (ADOXA) 100 MG tablet, Take 1 tablet (100 mg total) by mouth 2 (two) times a day for 10 days, Disp: 20 tablet, Rfl: 0    fluticasone (FLONASE) 50 mcg/act nasal spray, 2 sprays into each nostril daily, Disp: 16 g, Rfl: 5    fluticasone (FLONASE) 50 mcg/act nasal spray, 1 spray into each nostril daily, Disp: 9.9 mL, Rfl: 0    gabapentin (NEURONTIN) 300 mg capsule, Take 1 capsule (300 mg total) by mouth 2 (two) times a day, Disp: 60 capsule, Rfl: 5    ibuprofen (MOTRIN) 200 mg tablet, Take 2 tablets by mouth every 6 (six) hours as needed, Disp: , Rfl:     levothyroxine (Euthyrox) 150 mcg tablet, Take 1 tablet (150 mcg total) by mouth daily in the early morning, Disp: 30 tablet, Rfl: 5    venlafaxine (EFFEXOR-XR) 150 mg 24 hr capsule, , Disp: , Rfl:     mometasone (ELOCON) 0.1 % ointment, Apply topically daily (Patient not taking: Reported on 1/22/2024), Disp: , Rfl:     pantoprazole (PROTONIX) 40 mg tablet, Take 1 tablet (40 mg total) by mouth daily (Patient not taking: Reported on 1/22/2024), Disp: 30 tablet, Rfl: 0    Current Allergies     Allergies as of 01/22/2024 - Reviewed 01/22/2024   Allergen Reaction Noted    Cefuroxime Hives 11/20/2021    Celecoxib Other (See Comments) 11/20/2021    Clindamycin Other (See Comments) 11/20/2021    Duloxetine hcl Other (See Comments) 11/20/2021    Gadolinium derivatives Hives 04/14/2021    Iodinated contrast media Itching and Swelling 12/26/2018    Penicillins Other (See Comments) 04/14/2021            The following portions of the patient's history were reviewed and updated as appropriate:  "allergies, current medications, past family history, past medical history, past social history, past surgical history and problem list.     Past Medical History:   Diagnosis Date    Arthritis     Fatty liver     Fibromyalgia     Mood swings     Thyroid activity decreased        Past Surgical History:   Procedure Laterality Date    BREAST BIOPSY Left     benign    CHOLECYSTECTOMY      COLONOSCOPY      2017    HYSTERECTOMY      OOPHORECTOMY Right        Family History   Problem Relation Age of Onset    Diabetes Father     Prostate cancer Father     Diabetes Other     Breast cancer Mother     Breast cancer Sister 60    Bone cancer Maternal Grandmother     Cancer Paternal Aunt          Medications have been verified.        Objective   /80 (BP Location: Left arm, Patient Position: Sitting)   Pulse 67   Temp 99.4 °F (37.4 °C)   Resp 16   Ht 5' 7\" (1.702 m)   Wt 112 kg (248 lb)   SpO2 96%   BMI 38.84 kg/m²        Physical Exam     Physical Exam  Vitals and nursing note reviewed.   Constitutional:       Appearance: She is well-developed and normal weight.   HENT:      Head: Normocephalic and atraumatic.      Right Ear: Tympanic membrane and ear canal normal.      Left Ear: Tympanic membrane and ear canal normal.      Nose: Congestion and rhinorrhea present.      Comments: Boggy  mucosa  max sinus tender to palp      Mouth/Throat:      Mouth: Mucous membranes are moist.   Eyes:      Conjunctiva/sclera: Conjunctivae normal.      Pupils: Pupils are equal, round, and reactive to light.   Cardiovascular:      Rate and Rhythm: Normal rate and regular rhythm.   Pulmonary:      Effort: Pulmonary effort is normal.      Comments: Minor coarse sounds   Abdominal:      Palpations: Abdomen is soft.   Musculoskeletal:      Cervical back: Normal range of motion and neck supple.   Skin:     General: Skin is warm.      Capillary Refill: Capillary refill takes less than 2 seconds.   Neurological:      Mental Status: She is " alert.

## 2024-01-26 ENCOUNTER — OFFICE VISIT (OUTPATIENT)
Dept: OBGYN CLINIC | Facility: CLINIC | Age: 69
End: 2024-01-26
Payer: MEDICARE

## 2024-01-26 VITALS
WEIGHT: 248 LBS | DIASTOLIC BLOOD PRESSURE: 70 MMHG | SYSTOLIC BLOOD PRESSURE: 120 MMHG | BODY MASS INDEX: 38.92 KG/M2 | HEIGHT: 67 IN

## 2024-01-26 DIAGNOSIS — N95.1 VAGINAL DRYNESS, MENOPAUSAL: ICD-10-CM

## 2024-01-26 DIAGNOSIS — L91.8 SKIN TAG: ICD-10-CM

## 2024-01-26 DIAGNOSIS — N90.7 SEBACEOUS CYST OF LABIA: Primary | ICD-10-CM

## 2024-01-26 DIAGNOSIS — N95.2 ATROPHIC VAGINITIS: ICD-10-CM

## 2024-01-26 DIAGNOSIS — N39.46 MIXED STRESS AND URGE URINARY INCONTINENCE: ICD-10-CM

## 2024-01-26 PROCEDURE — 99204 OFFICE O/P NEW MOD 45 MIN: CPT | Performed by: OBSTETRICS & GYNECOLOGY

## 2024-01-26 NOTE — PROGRESS NOTES
"Assessment/Plan:     Diagnoses and all orders for this visit:    Sebaceous cyst of labia  Diagnoses and all orders for this visit:    Sebaceous cyst of labia     -  Informed patient of \"vaginal bumps\" c/w sebaceous cysts.  No interventions indicated    Atrophic vaginitis    -  Vaginal estrogen reviewed.  Patient declined    Mixed stress and urge urinary incontinence    -  Patient has followed with Uro/Gyn    Vaginal dryness, menopausal    -  OTC vaginal lubricants reviewed    Skin tag    -  Skin tag on her right thigh.  Will follow up with Dermatology            Subjective:      Patient ID: Sarina Sheridan is a 68 y.o. female.    New patient presents with concern about \"lumps on vaginal area\" that have been present for at least a year    The following portions of the patient's history were reviewed and updated as appropriate: allergies, current medications, past family history, past medical history, past social history, past surgical history, and problem list.    Review of Systems   Constitutional:  Negative for activity change and unexpected weight change.   Genitourinary:  Negative for dyspareunia, dysuria, genital sores, pelvic pain, vaginal bleeding, vaginal discharge and vaginal pain.         Objective:      /70 (BP Location: Right arm, Patient Position: Sitting, Cuff Size: Standard)   Ht 5' 7\" (1.702 m)   Wt 112 kg (248 lb)   BMI 38.84 kg/m²          Physical Exam  Vitals and nursing note reviewed.   HENT:      Head: Normocephalic.   Abdominal:      General: There is no distension.      Palpations: Abdomen is soft. There is no mass.      Tenderness: There is no abdominal tenderness. There is no rebound.   Genitourinary:     General: Normal vulva.      Exam position: Lithotomy position.      Labia:         Right: No rash, tenderness or lesion.         Left: No rash, tenderness or lesion.       Urethra: No urethral pain or urethral lesion.      Vagina: Normal. No vaginal discharge.      Cervix: No " cervical motion tenderness, lesion or erythema.      Uterus: Normal.       Adnexa: Right adnexa normal and left adnexa normal.      Rectum: No external hemorrhoid.          Comments: Sebaceous cysts on b/l labia measuring 5 mm  Atrophic vaginal mucosa  Musculoskeletal:      Right lower leg: No edema.      Left lower leg: No edema.   Skin:     General: Skin is warm.   Neurological:      Mental Status: She is alert and oriented to person, place, and time.   Psychiatric:         Mood and Affect: Mood normal.         Behavior: Behavior normal.         Thought Content: Thought content normal.

## 2024-03-05 DIAGNOSIS — I71.21 ANEURYSM OF ASCENDING AORTA WITHOUT RUPTURE (HCC): ICD-10-CM

## 2024-03-05 DIAGNOSIS — F33.1 MODERATE RECURRENT MAJOR DEPRESSION (HCC): Primary | ICD-10-CM

## 2024-03-06 RX ORDER — VENLAFAXINE HYDROCHLORIDE 150 MG/1
150 CAPSULE, EXTENDED RELEASE ORAL DAILY
Qty: 30 CAPSULE | Refills: 5 | Status: SHIPPED | OUTPATIENT
Start: 2024-03-06

## 2024-03-06 RX ORDER — ATENOLOL 25 MG/1
25 TABLET ORAL DAILY
Qty: 30 TABLET | Refills: 5 | Status: SHIPPED | OUTPATIENT
Start: 2024-03-06

## 2024-03-10 ENCOUNTER — HOSPITAL ENCOUNTER (EMERGENCY)
Facility: HOSPITAL | Age: 69
Discharge: HOME | End: 2024-03-10
Attending: EMERGENCY MEDICINE
Payer: MEDICARE

## 2024-03-10 VITALS
WEIGHT: 250 LBS | RESPIRATION RATE: 18 BRPM | HEART RATE: 77 BPM | DIASTOLIC BLOOD PRESSURE: 70 MMHG | BODY MASS INDEX: 37.89 KG/M2 | HEIGHT: 68 IN | OXYGEN SATURATION: 99 % | SYSTOLIC BLOOD PRESSURE: 161 MMHG | TEMPERATURE: 97.7 F

## 2024-03-10 DIAGNOSIS — J10.1 INFLUENZA A: Primary | ICD-10-CM

## 2024-03-10 DIAGNOSIS — J06.9 VIRAL URI WITH COUGH: ICD-10-CM

## 2024-03-10 LAB
FLUAV RNA RESP QL NAA+PROBE: POSITIVE
FLUBV RNA RESP QL NAA+PROBE: NEGATIVE
RSV RNA RESP QL NAA+PROBE: NEGATIVE
SARS-COV-2 RNA RESP QL NAA+PROBE: NEGATIVE

## 2024-03-10 PROCEDURE — 99284 EMERGENCY DEPT VISIT MOD MDM: CPT | Performed by: PHYSICIAN ASSISTANT

## 2024-03-10 PROCEDURE — 99283 EMERGENCY DEPT VISIT LOW MDM: CPT

## 2024-03-10 PROCEDURE — 0241U HB NFCT DS VIR RESP RNA 4 TRGT: CPT | Performed by: EMERGENCY MEDICINE

## 2024-03-10 RX ORDER — ACETAMINOPHEN 500 MG
500 TABLET ORAL EVERY 6 HOURS PRN
Qty: 30 TABLET | Refills: 0 | Status: SHIPPED | OUTPATIENT
Start: 2024-03-10

## 2024-03-10 RX ORDER — IBUPROFEN 400 MG/1
800 TABLET ORAL ONCE
Status: COMPLETED | OUTPATIENT
Start: 2024-03-10 | End: 2024-03-10

## 2024-03-10 RX ORDER — ACETAMINOPHEN 325 MG/1
975 TABLET ORAL ONCE
Status: COMPLETED | OUTPATIENT
Start: 2024-03-10 | End: 2024-03-10

## 2024-03-10 RX ORDER — OSELTAMIVIR PHOSPHATE 75 MG/1
75 CAPSULE ORAL 2 TIMES DAILY
Qty: 10 CAPSULE | Refills: 0 | Status: SHIPPED | OUTPATIENT
Start: 2024-03-10 | End: 2024-03-15

## 2024-03-10 RX ORDER — LORATADINE 10 MG/1
10 TABLET ORAL DAILY
Qty: 20 TABLET | Refills: 0 | Status: SHIPPED | OUTPATIENT
Start: 2024-03-10

## 2024-03-10 RX ORDER — IBUPROFEN 400 MG/1
400 TABLET ORAL EVERY 6 HOURS PRN
Qty: 30 TABLET | Refills: 0 | Status: SHIPPED | OUTPATIENT
Start: 2024-03-10

## 2024-03-10 RX ORDER — DIPHENHYDRAMINE HCL 25 MG
25 TABLET ORAL ONCE
Status: COMPLETED | OUTPATIENT
Start: 2024-03-10 | End: 2024-03-10

## 2024-03-10 RX ADMIN — DIPHENHYDRAMINE HYDROCHLORIDE 25 MG: 25 TABLET ORAL at 19:04

## 2024-03-10 RX ADMIN — IBUPROFEN 800 MG: 400 TABLET, FILM COATED ORAL at 19:04

## 2024-03-10 RX ADMIN — ACETAMINOPHEN 975 MG: 325 TABLET, FILM COATED ORAL at 19:04

## 2024-03-10 NOTE — ED PROVIDER NOTES
History  Chief Complaint   Patient presents with    Cough     Pt to ED from home for cough, nasal congestion, post nasal drip since yesterday. Dayquil not helping per pt. Denies SOB/chest pain.        History provided by:  Patient and spouse  Cough  Cough characteristics:  Harsh and non-productive  Severity:  Moderate  Onset quality:  Sudden  Duration:  1 day  Timing:  Constant  Progression:  Worsening  Chronicity:  New  Smoker: no    Context: upper respiratory infection    Context: not fumes and not sick contacts    Relieved by:  Nothing  Ineffective treatments:  None tried (Over-the-counter DayQuil and NyQuil)  Associated symptoms: fever, headaches, rhinorrhea and sinus congestion    Associated symptoms: no chest pain, no chills, no diaphoresis, no rash, no shortness of breath, no sore throat and no wheezing    URI  Presenting symptoms: cough, fever and rhinorrhea    Presenting symptoms: no sore throat    Associated symptoms: arthralgias and headaches    Associated symptoms: no wheezing        Prior to Admission Medications   Prescriptions Last Dose Informant Patient Reported? Taking?   atenolol (TENORMIN) 25 mg tablet   No No   Sig: Take 1 tablet (25 mg total) by mouth daily   benzonatate (TESSALON PERLES) 100 mg capsule   No No   Sig: Take 1 capsule (100 mg total) by mouth 3 (three) times a day as needed for cough   fluticasone (FLONASE) 50 mcg/act nasal spray   No No   Si sprays into each nostril daily   fluticasone (FLONASE) 50 mcg/act nasal spray   No No   Si spray into each nostril daily   gabapentin (NEURONTIN) 300 mg capsule   No No   Sig: Take 1 capsule (300 mg total) by mouth 2 (two) times a day   ibuprofen (MOTRIN) 200 mg tablet  Self Yes No   Sig: Take 2 tablets by mouth every 6 (six) hours as needed   levothyroxine (Euthyrox) 150 mcg tablet  Self No No   Sig: Take 1 tablet (150 mcg total) by mouth daily in the early morning   mometasone (ELOCON) 0.1 % ointment  Self Yes No   Sig: Apply  topically daily   Patient not taking: Reported on 2024   pantoprazole (PROTONIX) 40 mg tablet  Self No No   Sig: Take 1 tablet (40 mg total) by mouth daily   Patient not taking: Reported on 2024   venlafaxine (EFFEXOR-XR) 150 mg 24 hr capsule   No No   Sig: Take 1 capsule (150 mg total) by mouth daily      Facility-Administered Medications: None       Past Medical History:   Diagnosis Date    Anemia 1987    Arthritis     Asthma 2019    Depression     Don't remember the date    Fatty liver     Fibromyalgia     Hypertension     Mood swings     Thyroid activity decreased        Past Surgical History:   Procedure Laterality Date    BREAST BIOPSY Left     benign    BREAST LUMPECTOMY      Not sure of the date was not cancer.     SECTION  1982    CHOLECYSTECTOMY      COLONOSCOPY      2017    HYSTERECTOMY      OOPHORECTOMY Right        Family History   Problem Relation Age of Onset    Diabetes Father     Prostate cancer Father     Heart attack Father     Diabetes Other     Breast cancer Mother     Breast cancer Sister 60    Bone cancer Maternal Grandmother     Colon cancer Paternal Grandfather     Seizures Sister     Stroke Sister         Passed away    Cancer Paternal Aunt      I have reviewed and agree with the history as documented.    E-Cigarette/Vaping    E-Cigarette Use Never User      E-Cigarette/Vaping Substances    Nicotine No     THC No     CBD No     Flavoring No     Other No     Unknown No      Social History     Tobacco Use    Smoking status: Former     Current packs/day: 0.00     Types: Cigarettes     Quit date: 1988     Years since quittin.6     Passive exposure: Never    Smokeless tobacco: Never   Vaping Use    Vaping status: Never Used   Substance Use Topics    Alcohol use: Yes     Alcohol/week: 2.0 standard drinks of alcohol     Types: 2 Glasses of wine per week     Comment: Maybe once a month    Drug use: Never       Review of Systems   Constitutional:  Positive for  fever. Negative for chills and diaphoresis.   HENT:  Positive for rhinorrhea. Negative for sore throat.    Respiratory:  Positive for cough. Negative for shortness of breath and wheezing.    Cardiovascular:  Negative for chest pain.   Gastrointestinal:  Positive for abdominal pain.        Epigastric   Genitourinary:  Negative for difficulty urinating.   Musculoskeletal:  Positive for arthralgias.   Skin:  Negative for rash.   Neurological:  Positive for headaches.   All other systems reviewed and are negative.      Physical Exam  Physical Exam  Constitutional:       General: She is not in acute distress.     Appearance: Normal appearance. She is ill-appearing. She is not toxic-appearing or diaphoretic.   HENT:      Head: Normocephalic and atraumatic.      Right Ear: External ear normal.      Left Ear: External ear normal.      Nose: Congestion and rhinorrhea present.      Mouth/Throat:      Pharynx: No oropharyngeal exudate.   Eyes:      Conjunctiva/sclera: Conjunctivae normal.      Pupils: Pupils are equal, round, and reactive to light.   Cardiovascular:      Rate and Rhythm: Normal rate.   Pulmonary:      Effort: Pulmonary effort is normal.      Breath sounds: No wheezing or rales.      Comments: Dry cough noted on exam.  Abdominal:      General: There is no distension.      Palpations: There is no mass.      Tenderness: There is abdominal tenderness. There is no right CVA tenderness, guarding or rebound.      Hernia: No hernia is present.      Comments: Tenderness at the epigastrium.  Nonfocal soft abdomen obese.   Musculoskeletal:         General: Normal range of motion.      Cervical back: Normal range of motion.   Skin:     General: Skin is warm and dry.      Capillary Refill: Capillary refill takes less than 2 seconds.   Neurological:      General: No focal deficit present.      Mental Status: She is alert and oriented to person, place, and time.   Psychiatric:         Mood and Affect: Mood normal.          Behavior: Behavior normal.         Vital Signs  ED Triage Vitals [03/10/24 1722]   Temperature Pulse Respirations Blood Pressure SpO2   97.7 °F (36.5 °C) 77 18 161/70 99 %      Temp Source Heart Rate Source Patient Position - Orthostatic VS BP Location FiO2 (%)   Temporal Monitor Sitting Left arm --      Pain Score       No Pain           Vitals:    03/10/24 1722   BP: 161/70   Pulse: 77   Patient Position - Orthostatic VS: Sitting         Visual Acuity      ED Medications  Medications   ibuprofen (MOTRIN) tablet 800 mg (800 mg Oral Given 3/10/24 1904)   acetaminophen (TYLENOL) tablet 975 mg (975 mg Oral Given 3/10/24 1904)   diphenhydrAMINE (BENADRYL) tablet 25 mg (25 mg Oral Given 3/10/24 1904)       Diagnostic Studies  Results Reviewed       Procedure Component Value Units Date/Time    FLU/RSV/COVID - if FLU/RSV clinically relevant [[id number]]  (Abnormal) Collected: 03/10/24 1724    Lab Status: Final result Specimen: Nares from Nose Updated: 03/10/24 1809     SARS-CoV-2 Negative     INFLUENZA A PCR Positive     INFLUENZA B PCR Negative     RSV PCR Negative    Narrative:      FOR PEDIATRIC PATIENTS - copy/paste COVID Guidelines URL to browser: [email or web address]    SARS-CoV-2 assay is a Nucleic Acid Amplification assay intended for the  qualitative detection of nucleic acid from SARS-CoV-2 in nasopharyngeal  swabs. Results are for the presumptive identification of SARS-CoV-2 RNA.    Positive results are indicative of infection with SARS-CoV-2, the virus  causing COVID-19, but do not rule out bacterial infection or co-infection  with other viruses. Laboratories within the [place] and its  territories are required to report all positive results to the appropriate  public health authorities. Negative results do not preclude SARS-CoV-2  infection and should not be used as the sole basis for treatment or other  patient management decisions. Negative results  must be combined with  clinical observations, patient history, and epidemiological information.  This test has not been FDA cleared or approved.    This test has been authorized by FDA under an Emergency Use Authorization  (EUA). This test is only authorized for the duration of time the  declaration that circumstances exist justifying the authorization of the  emergency use of an in vitro diagnostic tests for detection of SARS-CoV-2  virus and/or diagnosis of COVID-19 infection under section 564(b)(1) of  the Act, 21 U.S.C. 360bbb-3(b)(1), unless the authorization is terminated  or revoked sooner. The test has been validated but independent review by FDA  and CLIA is pending.    Test performed using [employer] GeneXpert: This RT-PCR assay targets N2,  a region unique to SARS-CoV-2. A conserved region in the E-gene was chosen  for pan-Sarbecovirus detection which includes SARS-CoV-2.    According to CMS-2020-01-R, this platform meets the definition of high-throughput technology.                   No orders to display              Procedures  Procedures         ED Course                               SBIRT 22yo+      Flowsheet Row Most Recent Value   Initial Alcohol Screen: US AUDIT-C     1. How often do you have a drink containing alcohol? 0 Filed at: 03/10/2024 1723   2. How many drinks containing alcohol do you have on a typical day you are drinking?  0 Filed at: 03/10/2024 1723   3a. Male UNDER 65: How often do you have five or more drinks on one occasion? 0 Filed at: 03/10/2024 1723   3b. FEMALE Any Age, or MALE 65+: How often do you have 4 or more drinks on one occassion? 0 Filed at: 03/10/2024 1723   Audit-C Score 0 Filed at: 03/10/2024 1723   BRICE: How many times in the past year have you...    Used an illegal drug or used a prescription medication for non-medical reasons? Never Filed at: 03/10/2024 1723                      Medical Decision Making  68-year-old female presents emergency department for sudden  onset of headache body aches fever chills.  Patient does appear ill but nontoxic.  Patient mitts to intermittent usage of over-the-counter medication.  Patient states that symptoms are approximately 24 hours old.  At this time laboratory testing is positive for influenza.  Patient complains of difficulty sleeping but this was chronic in nature and will defer to patient's primary care for further evaluation and management.  Patient is in the timeframe to start antivirals and will send to her pharmacy.  Educated patient and  of diagnosis and home management.  Encourage patient to utilize antipyretic, analgesic medications as prescribed.  Educated patient on timing and course of disease.  Educated patient on persistent or worsening signs symptoms or any concern and to either follow-up with primary care and or return the emergency department.  Patient was discharged in ambulatory healthy-appearing condition.    Risk  OTC drugs.  Prescription drug management.             Disposition  Final diagnoses:   Influenza A   Viral URI with cough     Time reflects when diagnosis was documented in both MDM as applicable and the Disposition within this note       Time User Action Codes Description Comment    3/10/2024  7:00 PM Jayjay Elaine [J10.1] Influenza A     3/10/2024  7:11 PM Jayjay Elaine [J06.9] Viral URI with cough           ED Disposition       ED Disposition   Discharge    Condition   Stable    Date/Time   Sun Mar 10, 2024 1911    Comment   Sarina Sheridan discharge to home/self care.                   Follow-up Information    None         Discharge Medication List as of 3/10/2024  7:18 PM        START taking these medications    Details   acetaminophen (TYLENOL) 500 mg tablet Take 1 tablet (500 mg total) by mouth every 6 (six) hours as needed for mild pain or moderate pain, Starting Sun 3/10/2024, Print      budesonide (RINOCORT AQUA) 32 MCG/ACT nasal spray 1 spray into each nostril daily for 7 days,  Starting Sun 3/10/2024, Until Sun 3/17/2024, Normal      loratadine (CLARITIN) 10 mg tablet Take 1 tablet (10 mg total) by mouth daily, Starting Sun 3/10/2024, Normal      oseltamivir (TAMIFLU) 75 mg capsule Take 1 capsule (75 mg total) by mouth 2 (two) times a day for 5 days, Starting Sun 3/10/2024, Until Fri 3/15/2024, Normal           CONTINUE these medications which have CHANGED    Details   ibuprofen (MOTRIN) 400 mg tablet Take 1 tablet (400 mg total) by mouth every 6 (six) hours as needed for mild pain or moderate pain, Starting Sun 3/10/2024, Print           CONTINUE these medications which have NOT CHANGED    Details   atenolol (TENORMIN) 25 mg tablet Take 1 tablet (25 mg total) by mouth daily, Starting Wed 3/6/2024, Normal      benzonatate (TESSALON PERLES) 100 mg capsule Take 1 capsule (100 mg total) by mouth 3 (three) times a day as needed for cough, Starting Mon 1/22/2024, Normal      !! fluticasone (FLONASE) 50 mcg/act nasal spray 2 sprays into each nostril daily, Starting Tue 11/28/2023, Normal      !! fluticasone (FLONASE) 50 mcg/act nasal spray 1 spray into each nostril daily, Starting Mon 1/22/2024, Normal      gabapentin (NEURONTIN) 300 mg capsule Take 1 capsule (300 mg total) by mouth 2 (two) times a day, Starting Tue 1/16/2024, Normal      levothyroxine (Euthyrox) 150 mcg tablet Take 1 tablet (150 mcg total) by mouth daily in the early morning, Starting Mon 10/30/2023, Normal      mometasone (ELOCON) 0.1 % ointment Apply topically daily, Historical Med      pantoprazole (PROTONIX) 40 mg tablet Take 1 tablet (40 mg total) by mouth daily, Starting Mon 10/30/2023, Normal      venlafaxine (EFFEXOR-XR) 150 mg 24 hr capsule Take 1 capsule (150 mg total) by mouth daily, Starting Wed 3/6/2024, Normal       !! - Potential duplicate medications found. Please discuss with provider.          No discharge procedures on file.    PDMP Review       None            ED Provider  Electronically Signed by              Jayjay Elaine PA-C  03/10/24 2057

## 2024-03-20 ENCOUNTER — OFFICE VISIT (OUTPATIENT)
Dept: URGENT CARE | Facility: CLINIC | Age: 69
End: 2024-03-20
Payer: MEDICARE

## 2024-03-20 VITALS
RESPIRATION RATE: 16 BRPM | HEART RATE: 74 BPM | SYSTOLIC BLOOD PRESSURE: 148 MMHG | OXYGEN SATURATION: 98 % | DIASTOLIC BLOOD PRESSURE: 84 MMHG | TEMPERATURE: 98.2 F

## 2024-03-20 DIAGNOSIS — B96.89 ACUTE BACTERIAL BRONCHITIS: Primary | ICD-10-CM

## 2024-03-20 DIAGNOSIS — J20.8 ACUTE BACTERIAL BRONCHITIS: Primary | ICD-10-CM

## 2024-03-20 PROCEDURE — G0463 HOSPITAL OUTPT CLINIC VISIT: HCPCS

## 2024-03-20 PROCEDURE — 99213 OFFICE O/P EST LOW 20 MIN: CPT

## 2024-03-20 RX ORDER — BENZONATATE 200 MG/1
200 CAPSULE ORAL 3 TIMES DAILY PRN
Qty: 20 CAPSULE | Refills: 0 | Status: SHIPPED | OUTPATIENT
Start: 2024-03-20

## 2024-03-20 RX ORDER — PREDNISONE 20 MG/1
20 TABLET ORAL 2 TIMES DAILY
Qty: 10 TABLET | Refills: 0 | Status: SHIPPED | OUTPATIENT
Start: 2024-03-20 | End: 2024-03-25

## 2024-03-20 RX ORDER — ALBUTEROL SULFATE 90 UG/1
2 AEROSOL, METERED RESPIRATORY (INHALATION) EVERY 6 HOURS PRN
Qty: 18 G | Refills: 0 | Status: SHIPPED | OUTPATIENT
Start: 2024-03-20

## 2024-03-20 RX ORDER — AZITHROMYCIN 250 MG/1
TABLET, FILM COATED ORAL
Qty: 6 TABLET | Refills: 0 | Status: SHIPPED | OUTPATIENT
Start: 2024-03-20 | End: 2024-03-24

## 2024-03-20 NOTE — PROGRESS NOTES
[facility]        NAME: Sarina Sheridan is a 68 y.o. female  : 1955    MRN: [id number]  DATE: 2024  TIME: 3:05 PM    Assessment and Plan   Acute bacterial bronchitis [J20.8, B96.89]  1. Acute bacterial bronchitis  predniSONE 20 mg tablet    benzonatate (TESSALON) 200 MG capsule    azithromycin (ZITHROMAX) 250 mg tablet    albuterol (Ventolin HFA) 90 mcg/act inhaler            Patient Instructions   Take antibiotics and steroids as prescribed  For decongestion, Over The Counter medications:  Humidified air  Warm moist air such as a hot cup of water in a mug, sit at the dining room table with the mug on the table, put a towel over your head to cover over the mug and breath in the warm steam (don't drink the fluid in case you have mucus that drips in).  Vicks Vapor Rub  For Cough or sore throat:  Salt water gurgle  Honey  Chloraseptic spray  Throat lozenges  Over the Counter Tylenol or Ibuprofen  Dextromethorphan 30mg PO every 6-8 hours for cough. max 120 mg in 24 hour period or Tessalon that was prescribed.    Follow up with Primary Care Provider in 3-5 days if not improving.  Proceed to Emergency Department if symptoms worsen.    If tests have been performed at [facility], our office will contact you with results if changes need to be made to the care plan discussed with you at the visit.  You can review your full results on [facility].    Chief Complaint     Chief Complaint   Patient presents with   • Cough     Pt reports a cough x10 days. Seen in ED 3/10/24 - positive influenza A. Did not take tamiflu. Taking nyquil.         History of Present Illness       Had flu 10 days ago. Cough still present. States it feels like illness had traveled from her head to her chest several days ago. States SOB and feeling weak. Has been taking Nyquil without improvement of symptoms.    Cough  Associated symptoms include a fever, headaches and shortness of breath. Pertinent negatives include no  chest pain, chills or sore throat.       Review of Systems   Review of Systems   Constitutional:  Positive for fever. Negative for chills.   HENT:  Negative for congestion, sinus pressure, sinus pain and sore throat.    Respiratory:  Positive for cough and shortness of breath.    Cardiovascular:  Negative for chest pain and palpitations.   Gastrointestinal:  Positive for diarrhea and nausea. Negative for abdominal pain and vomiting.   Neurological:  Positive for dizziness, weakness and headaches.         Current Medications       Current Outpatient Medications:   •  albuterol (Ventolin HFA) 90 mcg/act inhaler, Inhale 2 puffs every 6 (six) hours as needed for wheezing, Disp: 18 g, Rfl: 0  •  atenolol (TENORMIN) 25 mg tablet, Take 1 tablet (25 mg total) by mouth daily, Disp: 30 tablet, Rfl: 5  •  azithromycin (ZITHROMAX) 250 mg tablet, Take 2 tablets today then 1 tablet daily x 4 days, Disp: 6 tablet, Rfl: 0  •  benzonatate (TESSALON) 200 MG capsule, Take 1 capsule (200 mg total) by mouth 3 (three) times a day as needed for cough, Disp: 20 capsule, Rfl: 0  •  fluticasone (FLONASE) 50 mcg/act nasal spray, 2 sprays into each nostril daily, Disp: 16 g, Rfl: 5  •  gabapentin (NEURONTIN) 300 mg capsule, Take 1 capsule (300 mg total) by mouth 2 (two) times a day, Disp: 60 capsule, Rfl: 5  •  ibuprofen (MOTRIN) 400 mg tablet, Take 1 tablet (400 mg total) by mouth every 6 (six) hours as needed for mild pain or moderate pain, Disp: 30 tablet, Rfl: 0  •  levothyroxine (Euthyrox) 150 mcg tablet, Take 1 tablet (150 mcg total) by mouth daily in the early morning, Disp: 30 tablet, Rfl: 5  •  predniSONE 20 mg tablet, Take 1 tablet (20 mg total) by mouth 2 (two) times a day for 5 days, Disp: 10 tablet, Rfl: 0  •  venlafaxine (EFFEXOR-XR) 150 mg 24 hr capsule, Take 1 capsule (150 mg total) by mouth daily, Disp: 30 capsule, Rfl: 5  •  acetaminophen (TYLENOL) 500 mg tablet, Take 1 tablet (500 mg total) by mouth every 6 (six) hours as  needed for mild pain or moderate pain (Patient not taking: Reported on 3/20/2024), Disp: 30 tablet, Rfl: 0  •  benzonatate (TESSALON PERLES) 100 mg capsule, Take 1 capsule (100 mg total) by mouth 3 (three) times a day as needed for cough (Patient not taking: Reported on 3/20/2024), Disp: 20 capsule, Rfl: 0  •  budesonide (RINOCORT AQUA) 32 MCG/ACT nasal spray, 1 spray into each nostril daily for 7 days, Disp: 1 g, Rfl: 0  •  fluticasone (FLONASE) 50 mcg/act nasal spray, 1 spray into each nostril daily, Disp: 9.9 mL, Rfl: 0  •  loratadine (CLARITIN) 10 mg tablet, Take 1 tablet (10 mg total) by mouth daily (Patient not taking: Reported on 3/20/2024), Disp: 20 tablet, Rfl: 0  •  mometasone (ELOCON) 0.1 % ointment, Apply topically daily (Patient not taking: Reported on 1/22/2024), Disp: , Rfl:   •  pantoprazole (PROTONIX) 40 mg tablet, Take 1 tablet (40 mg total) by mouth daily (Patient not taking: Reported on 1/22/2024), Disp: 30 tablet, Rfl: 0    Current Allergies     Allergies as of 03/20/2024 - Reviewed 03/20/2024   Allergen Reaction Noted   • Cefuroxime Hives 11/20/2021   • Celecoxib Other (See Comments) 11/20/2021   • Clindamycin Other (See Comments) 11/20/2021   • Duloxetine hcl Other (See Comments) 11/20/2021   • Gadolinium derivatives Hives 04/14/2021   • Iodinated contrast media Itching and Swelling 12/26/2018   • Penicillins Other (See Comments) 04/14/2021            The following portions of the patient's history were reviewed and updated as appropriate: allergies, current medications, past family history, past medical history, past social history, past surgical history and problem list.     Past Medical History:   Diagnosis Date   • Anemia 1987   • Arthritis    • Asthma 2019   • Depression     Don't remember the date   • Fatty liver    • Fibromyalgia    • Hypertension 2023   • Mood swings    • Thyroid activity decreased        Past Surgical History:   Procedure Laterality Date   • BREAST BIOPSY Left      benign   • BREAST LUMPECTOMY      Not sure of the date was not cancer.   •  SECTION  1982   • CHOLECYSTECTOMY     • COLONOSCOPY      2017   • HYSTERECTOMY     • OOPHORECTOMY Right        Family History   Problem Relation Age of Onset   • Diabetes Father    • Prostate cancer Father    • Heart attack Father    • Diabetes Other    • Breast cancer Mother    • Breast cancer Sister 60   • Bone cancer Maternal Grandmother    • Colon cancer Paternal Grandfather    • Seizures Sister    • Stroke Sister         Passed away   • Cancer Paternal Aunt          Medications have been verified.        Objective   /84   Pulse 74   Temp 98.2 °F (36.8 °C)   Resp 16   SpO2 98%   No LMP recorded. Patient has had a hysterectomy.       Physical Exam     Physical Exam  Vitals and nursing note reviewed.   Constitutional:       Appearance: Normal appearance.   HENT:      Head: Normocephalic and atraumatic.      Right Ear: Tympanic membrane normal.      Left Ear: Tympanic membrane normal.      Nose: Nose normal. No congestion or rhinorrhea.      Mouth/Throat:      Mouth: Mucous membranes are moist.   Eyes:      Pupils: Pupils are equal, round, and reactive to light.   Cardiovascular:      Rate and Rhythm: Normal rate.      Pulses: Normal pulses.   Pulmonary:      Effort: Pulmonary effort is normal.      Breath sounds: Wheezing and rales present.   Abdominal:      General: Bowel sounds are normal.      Palpations: Abdomen is soft.   Skin:     General: Skin is warm and dry.      Capillary Refill: Capillary refill takes less than 2 seconds.   Neurological:      General: No focal deficit present.      Mental Status: She is alert and oriented to person, place, and time. Mental status is at baseline.      Sensory: No sensory deficit.      Motor: No weakness.   Psychiatric:         Mood and Affect: Mood normal.         Behavior: Behavior normal.         Thought Content: Thought content normal.

## 2024-03-20 NOTE — PATIENT INSTRUCTIONS
Take antibiotics and steroids as prescribed  For decongestion, Over The Counter medications:  Humidified air  Warm moist air such as a hot cup of water in a mug, sit at the dining room table with the mug on the table, put a towel over your head to cover over the mug and breath in the warm steam (don't drink the fluid in case you have mucus that drips in).  Vicks Vapor Rub  For Cough or sore throat:  Salt water gurgle  Honey  Chloraseptic spray  Throat lozenges  Over the Counter Tylenol or Ibuprofen  Dextromethorphan 30mg PO every 6-8 hours for cough. max 120 mg in 24 hour period or Tessalon that was prescribed.    Follow up with Primary Care Provider in 3-5 days if not improving.  Proceed to Emergency Department if symptoms worsen.    If tests have been performed at [facility], our office will contact you with results if changes need to be made to the care plan discussed with you at the visit.  You can review your full results on [facility] MyChart.

## 2024-04-02 ENCOUNTER — TELEPHONE (OUTPATIENT)
Dept: GASTROENTEROLOGY | Facility: CLINIC | Age: 69
End: 2024-04-02

## 2024-04-02 ENCOUNTER — OFFICE VISIT (OUTPATIENT)
Dept: GASTROENTEROLOGY | Facility: CLINIC | Age: 69
End: 2024-04-02
Payer: MEDICARE

## 2024-04-02 VITALS
WEIGHT: 249.8 LBS | DIASTOLIC BLOOD PRESSURE: 80 MMHG | SYSTOLIC BLOOD PRESSURE: 124 MMHG | BODY MASS INDEX: 37.86 KG/M2 | HEIGHT: 68 IN

## 2024-04-02 DIAGNOSIS — K76.0 FATTY LIVER: ICD-10-CM

## 2024-04-02 DIAGNOSIS — R11.0 NAUSEA: Primary | ICD-10-CM

## 2024-04-02 DIAGNOSIS — K21.9 GASTROESOPHAGEAL REFLUX DISEASE WITHOUT ESOPHAGITIS: ICD-10-CM

## 2024-04-02 PROCEDURE — 99214 OFFICE O/P EST MOD 30 MIN: CPT | Performed by: STUDENT IN AN ORGANIZED HEALTH CARE EDUCATION/TRAINING PROGRAM

## 2024-04-02 RX ORDER — PANTOPRAZOLE SODIUM 40 MG/1
40 TABLET, DELAYED RELEASE ORAL DAILY
Qty: 60 TABLET | Refills: 0 | Status: SHIPPED | OUTPATIENT
Start: 2024-04-02 | End: 2024-06-01

## 2024-04-02 NOTE — PROGRESS NOTES
"[facility] - Outpatient Follow-up Note  Sarina Sheridan 68 y.o. female MRN: [id number]  Encounter: [id number]    ASSESSMENT AND PLAN:      1. Nausea  New onset nausea for the past few months with a \"cold\" in her stomach.  Noted history of reflux, no prior EGD on record here.  Given new upper GI symptoms of nausea, history of reflux, age and distant tobacco use history, recommend upper endoscopy to ensure no gastric or esophageal lesions to put her at risk for malignancy, ulcer disease, erosions.  Advised to resume pantoprazole 40 mg daily for minimum 8 weeks.  - EGD; Future    2. Gastroesophageal reflux disease without esophagitis  History of reflux, no prior EGD on record.  Given age, tobacco use history in the distant past, BMI, ethnicity, recommend EGD to rule out erosive disease, Hook's or any lesions to put her at risk for upper GI malignancy.  - pantoprazole (PROTONIX) 40 mg tablet; Take 1 tablet (40 mg total) by mouth daily  Dispense: 60 tablet; Refill: 0  - EGD; Future    3. Fatty liver  Ultrasound with elastography without 0-F1 fibrosis, S3 steatosis.  Due for repeat liver tests in June, order placed.  Would follow LFTs annually.  Future imaging can be determined based on her trend.  We discussed at length the need for weight loss, goal 5 to 10% total body weight which can reverse any fibrosis.  She will start with lifestyle modifications but if need be in the future we can refer to medical weight management.  This was discussed with her in detail.  - Hepatic function panel; Future  - Hepatic function panel        Follow up appointment: For EGD & then 6 months in the office    _______________________      Chief Complaint   Patient presents with    Follow-up     Pt would like to follow up on liver values. Pt notes her IBS symptoms have not changed.       HPI:   Sarina is a 68 female with history of NAFLD, GERD, family history colon cancer who presents for routine office " "follow-up of her NAFLD.  She had an ultrasound with elastography with S3 steatosis and F2 0-F1 fibrosis.  She has had a lot of life stressors recently with her sister passing away last year and her mother passing away in February.  She also had the flu back on March 10 complicated by bronchitis. Her dietary patterns have been off recently as she had been caring for her mother and she been eating more junk food at irregular intervals.  She has had some new onset epigastric discomfort with associated nausea and feeling of a \"coldness\" in her abdomen.  She is concerned because her mother colon cancer with spread to her stomach.  The [place] [facility] recommended to have repeat in 5 years. The patient does have a smoking history but quit 35 years ago.    GI History:  [place] [facility] [place], 5-year follow-up  given family history  Prior EGD:  No prior EGD  Family hx: Mother with colon cancer  Surgical hx: , hysterectomy, oophorectomy  Blood thinners: Denies antiplatelet or anticoagulation use  NSAID use: Denies regular NSAID use  DM meds: None  Social Hx: Former tobacco use, no heavy alcohol use, no illicit drug use  Historical Information   Past Medical History:   Diagnosis Date    Anemia     Arthritis     Asthma     Depression     Don't remember the date    Fatty liver     Fibromyalgia     GERD (gastroesophageal reflux disease)     Not sure of the date .    Hypertension     Irritable bowel syndrome     Lactose intolerance     Not sure that I have that but do have diarrhea after drink      milk or have ice cream.k    Mood swings     Thyroid activity decreased      Past Surgical History:   Procedure Laterality Date    BREAST BIOPSY Left     benign    BREAST LUMPECTOMY      Not sure of the date was not cancer.     SECTION  1982    CHOLECYSTECTOMY      COLONOSCOPY      2017    HYSTERECTOMY      OOPHORECTOMY Right     TUBAL LIGATION  " 1982    Done after my second child.     Social History     Substance and Sexual Activity   Alcohol Use Yes    Alcohol/week: 2.0 standard drinks of alcohol    Types: 2 Glasses of wine per week    Comment: Maybe once a month     Social History     Substance and Sexual Activity   Drug Use Never     Social History     Tobacco Use   Smoking Status Former    Current packs/day: 0.00    Average packs/day: 1 pack/day for 17.1 years (17.1 ttl pk-yrs)    Types: Cigarettes    Start date: 1971    Quit date: 1988    Years since quittin.7    Passive exposure: Never   Smokeless Tobacco Never   Tobacco Comments    Quite in      Family History   Problem Relation Age of Onset    Diabetes Father     Prostate cancer Father     Heart attack Father     Diabetes Other     Breast cancer Mother     Colon cancer Mother         She  2024    Colon polyps Mother     Hearing loss Mother     Stomach cancer Mother     Breast cancer Sister 60    Asthma Sister         Loida had asthma since she was a child    Colon polyps Sister     Hypothyroidism Sister     Irritable bowel syndrome Sister     Bone cancer Maternal Grandmother     Colon cancer Paternal Grandfather     Seizures Sister     Stroke Sister         Passed away    Arthritis Sister     Colon polyps Brother     Cancer Paternal Aunt         Fathers sister she had lung cancer.         Current Outpatient Medications:     atenolol (TENORMIN) 25 mg tablet    fluticasone (FLONASE) 50 mcg/act nasal spray    gabapentin (NEURONTIN) 300 mg capsule    ibuprofen (MOTRIN) 400 mg tablet    levothyroxine (Euthyrox) 150 mcg tablet    pantoprazole (PROTONIX) 40 mg tablet    venlafaxine (EFFEXOR-XR) 150 mg 24 hr capsule    acetaminophen (TYLENOL) 500 mg tablet    albuterol (Ventolin HFA) 90 mcg/act inhaler    benzonatate (TESSALON PERLES) 100 mg capsule    benzonatate (TESSALON) 200 MG capsule    budesonide (RINOCORT AQUA) 32 MCG/ACT nasal spray    fluticasone (FLONASE) 50 mcg/act  "nasal spray    loratadine (CLARITIN) 10 mg tablet    mometasone (ELOCON) 0.1 % ointment  Allergies   Allergen Reactions    Cefuroxime Hives    Celecoxib Other (See Comments)    Clindamycin Other (See Comments)    Duloxetine Hcl Other (See Comments)    Gadolinium Derivatives Hives     Hives after MRI Brain at [facility] 2018 per patient    Iodinated Contrast Media Itching and Swelling    Penicillins Other (See Comments)     Unknown       Reviewed medications and allergies and updated as indicated    PHYSICAL EXAM:    Blood pressure 124/80, height 5' 7.5\" (1.715 m), weight 113 kg (249 lb 12.8 oz). Body mass index is 38.55 kg/m².  General Appearance: NAD, cooperative, alert  Eyes: Anicteric  GI:  Soft, non-tender, non-distended; normal bowel sounds; no masses, no organomegaly   Rectal: Deferred  Musculoskeletal: No edema.  Skin:  No jaundice    Lab Results:   Lab Results   Component Value Date    WBC 8.28 04/14/2021    WBC 5.27 03/29/2021    WBC 3.87 (L) 08/07/2014    HGB 14.5 04/14/2021    HGB 14.1 03/29/2021    HGB 13.3 08/07/2014    MCV 87 04/14/2021     04/14/2021     03/29/2021     08/07/2014     Lab Results   Component Value Date     08/07/2014    K 3.8 04/14/2021     04/14/2021    CO2 30 04/14/2021    ANIONGAP 8 08/07/2014    BUN 12 04/14/2021    CREATININE 0.84 04/14/2021    GLUCOSE 96 08/07/2014    GLUF 115 (H) 03/29/2021    CALCIUM 9.3 04/14/2021    AST 19 04/14/2021    AST 9 03/29/2021    AST 22 04/01/2019    ALT 35 04/14/2021    ALT 33 03/29/2021    ALT 43 (H) 04/01/2019    ALKPHOS 103 04/14/2021    ALKPHOS 76 03/29/2021    ALKPHOS 70 04/01/2019    PROT 7.0 08/07/2014    BILITOT 0.51 08/07/2014    EGFR 74 04/14/2021     Lab Results   Component Value Date    IRON 95 09/01/2023    TIBC 360 09/01/2023    FERRITIN 58 09/01/2023     Lab Results   Component Value Date    LIPASE 95 04/14/2021       Radiology Results:   No results found.  "

## 2024-04-02 NOTE — TELEPHONE ENCOUNTER
Scheduled date of colonoscopy (as of today):6-7-24  Physician performing colonoscopy:neo  Location of colonoscopy:[facility]  Instructions reviewed with patient by: leif  Clearances: na

## 2024-04-02 NOTE — PATIENT INSTRUCTIONS
1.  Please start the pantoprazole every morning approximately 30 to 60 minutes before breakfast.  We will do an 8-week course.  We will also investigate with an upper endoscopy.  2.  Please schedule repeat liver test done in June sometime.  We will follow these annually.  Continue to work on healthy lifestyle with goal 5 to 10% total body weight loss, approximately 25 pounds.  3.  You are not due for a colonoscopy until 2027.

## 2024-04-22 DIAGNOSIS — E03.9 HYPOTHYROIDISM, UNSPECIFIED TYPE: ICD-10-CM

## 2024-04-22 RX ORDER — LEVOTHYROXINE SODIUM 0.15 MG/1
TABLET ORAL
Qty: 30 TABLET | Refills: 0 | Status: SHIPPED | OUTPATIENT
Start: 2024-04-22

## 2024-05-06 ENCOUNTER — OFFICE VISIT (OUTPATIENT)
Dept: FAMILY MEDICINE CLINIC | Facility: CLINIC | Age: 69
End: 2024-05-06
Payer: MEDICARE

## 2024-05-06 VITALS
BODY MASS INDEX: 38.28 KG/M2 | OXYGEN SATURATION: 97 % | TEMPERATURE: 98.1 F | SYSTOLIC BLOOD PRESSURE: 130 MMHG | HEART RATE: 78 BPM | HEIGHT: 68 IN | RESPIRATION RATE: 17 BRPM | WEIGHT: 252.6 LBS | DIASTOLIC BLOOD PRESSURE: 68 MMHG

## 2024-05-06 DIAGNOSIS — G91.2 NORMAL PRESSURE HYDROCEPHALUS (HCC): ICD-10-CM

## 2024-05-06 DIAGNOSIS — R73.01 IMPAIRED FASTING GLUCOSE: ICD-10-CM

## 2024-05-06 DIAGNOSIS — I71.21 ANEURYSM OF ASCENDING AORTA WITHOUT RUPTURE (HCC): ICD-10-CM

## 2024-05-06 DIAGNOSIS — Z13.6 SCREENING FOR CARDIOVASCULAR CONDITION: ICD-10-CM

## 2024-05-06 DIAGNOSIS — E78.5 HYPERLIPIDEMIA, UNSPECIFIED HYPERLIPIDEMIA TYPE: ICD-10-CM

## 2024-05-06 DIAGNOSIS — F33.1 MODERATE RECURRENT MAJOR DEPRESSION (HCC): ICD-10-CM

## 2024-05-06 DIAGNOSIS — Z79.899 HIGH RISK MEDICATION USE: ICD-10-CM

## 2024-05-06 DIAGNOSIS — R41.89 COGNITIVE CHANGE: ICD-10-CM

## 2024-05-06 DIAGNOSIS — K76.0 FATTY LIVER: ICD-10-CM

## 2024-05-06 DIAGNOSIS — E03.9 HYPOTHYROIDISM, UNSPECIFIED TYPE: Primary | ICD-10-CM

## 2024-05-06 PROCEDURE — G2211 COMPLEX E/M VISIT ADD ON: HCPCS | Performed by: FAMILY MEDICINE

## 2024-05-06 PROCEDURE — 99214 OFFICE O/P EST MOD 30 MIN: CPT | Performed by: FAMILY MEDICINE

## 2024-05-06 NOTE — PROGRESS NOTES
Assessment/Plan:     Diagnoses and all orders for this visit:    Hypothyroidism, unspecified type  Comments:  TSH ordered patient will continue medication  Orders:  -     TSH, 3rd generation; Future  -     TSH, 3rd generation    Screening for cardiovascular condition  -     CBC; Future  -     Comprehensive metabolic panel; Future  -     UA (URINE) with reflex to Scope; Future  -     CBC  -     Comprehensive metabolic panel  -     UA (URINE) with reflex to Scope    Impaired fasting glucose  Comments:  Labs ordered  Orders:  -     Hemoglobin A1C; Future  -     Hemoglobin A1C    Fatty liver  -     Lipid panel; Future  -     Lipid panel    Hyperlipidemia, unspecified hyperlipidemia type  -     Lipid panel; Future  -     Lipid panel    Normal pressure hydrocephalus (HCC)  Comments:  MRI ordered  Orders:  -     MRI brain wo contrast; Future    Moderate recurrent major depression (HCC)  Comments:  This is stable continue medication    Aneurysm of ascending aorta without rupture (HCC)  Comments:  This is stable    High risk medication use  -     CBC; Future  -     Comprehensive metabolic panel; Future  -     UA (URINE) with reflex to Scope; Future  -     CBC  -     Comprehensive metabolic panel  -     UA (URINE) with reflex to Scope    Cognitive change  Comments:  Etiology is unclear  MRI ordered May consider MMSE  Orders:  -     MRI brain wo contrast; Future     Labs ordered  MRI ordered of her brain to put this hydrocephalus issue to rest and also to look into her cognitive changes  May need an MMSE  Will follow-up after blood work      Subjective:     Chief Complaint   Patient presents with    Follow-up     Routine 6 month follow up        Patient ID: Sarina Sheridan is a 68 y.o. female.    Patient presents today for check of chronic additions  She is complaining of some cognitive decline issues  She has a history of hydrocephalus in the past that is unclear  Otherwise no other acute complaints today        The  "following portions of the patient's history were reviewed and updated as appropriate: allergies, current medications, past family history, past medical history, past social history, past surgical history and problem list.    Review of Systems   Constitutional: Negative.    HENT: Negative.     Eyes: Negative.    Respiratory: Negative.     Cardiovascular: Negative.    Gastrointestinal: Negative.    Endocrine: Negative.    Genitourinary: Negative.    Musculoskeletal: Negative.    Skin: Negative.    Allergic/Immunologic: Negative.    Neurological: Negative.    Hematological: Negative.    Psychiatric/Behavioral: Negative.     All other systems reviewed and are negative.        Objective:    Vitals:    05/06/24 1038 05/06/24 1102   BP: 140/80 130/68   BP Location: Left arm    Patient Position: Sitting    Cuff Size: Large    Pulse: 78    Resp: 17    Temp: 98.1 °F (36.7 °C)    TempSrc: Tympanic    SpO2: 97%    Weight: 115 kg (252 lb 9.6 oz)    Height: 5' 7.5\" (1.715 m)           Physical Exam  Vitals and nursing note reviewed.   Constitutional:       Appearance: Normal appearance. She is well-developed.   HENT:      Head: Normocephalic and atraumatic.      Right Ear: External ear normal.      Left Ear: External ear normal.   Eyes:      Conjunctiva/sclera: Conjunctivae normal.      Pupils: Pupils are equal, round, and reactive to light.   Cardiovascular:      Rate and Rhythm: Normal rate and regular rhythm.      Heart sounds: Normal heart sounds.   Pulmonary:      Effort: Pulmonary effort is normal.      Breath sounds: Normal breath sounds.   Abdominal:      General: Bowel sounds are normal.      Palpations: Abdomen is soft.   Musculoskeletal:         General: Normal range of motion.      Cervical back: Normal range of motion.   Skin:     General: Skin is warm and dry.   Neurological:      General: No focal deficit present.      Mental Status: She is alert and oriented to person, place, and time.      Deep Tendon Reflexes: " Reflexes are normal and symmetric.   Psychiatric:         Behavior: Behavior normal.         Thought Content: Thought content normal.         Judgment: Judgment normal.

## 2024-05-23 LAB
ALBUMIN SERPL-MCNC: 4.7 G/DL (ref 3.9–4.9)
ALBUMIN/GLOB SERPL: 2 {RATIO} (ref 1.2–2.2)
ALP SERPL-CCNC: 74 IU/L (ref 44–121)
ALT SERPL-CCNC: 33 IU/L (ref 0–32)
APPEARANCE UR: CLEAR
AST SERPL-CCNC: 17 IU/L (ref 0–40)
BILIRUB SERPL-MCNC: 0.5 MG/DL (ref 0–1.2)
BILIRUB UR QL STRIP: NEGATIVE
BUN SERPL-MCNC: 17 MG/DL (ref 8–27)
BUN/CREAT SERPL: 19 (ref 12–28)
CALCIUM SERPL-MCNC: 10.1 MG/DL (ref 8.7–10.3)
CHLORIDE SERPL-SCNC: 100 MMOL/L (ref 96–106)
CHOLEST SERPL-MCNC: 209 MG/DL (ref 100–199)
CHOLEST/HDLC SERPL: 2.5 RATIO (ref 0–4.4)
CO2 SERPL-SCNC: 26 MMOL/L (ref 20–29)
COLOR UR: YELLOW
CREAT SERPL-MCNC: 0.88 MG/DL (ref 0.57–1)
EGFR: 72 ML/MIN/1.73
ERYTHROCYTE [DISTWIDTH] IN BLOOD BY AUTOMATED COUNT: 12.2 % (ref 11.7–15.4)
EST. AVERAGE GLUCOSE BLD GHB EST-MCNC: 108 MG/DL
GLOBULIN SER-MCNC: 2.3 G/DL (ref 1.5–4.5)
GLUCOSE SERPL-MCNC: 91 MG/DL (ref 70–99)
GLUCOSE UR QL: NEGATIVE
HBA1C MFR BLD: 5.4 % (ref 4.8–5.6)
HCT VFR BLD AUTO: 42.7 % (ref 34–46.6)
HDLC SERPL-MCNC: 82 MG/DL
HGB BLD-MCNC: 14.1 G/DL (ref 11.1–15.9)
HGB UR QL STRIP: NEGATIVE
KETONES UR QL STRIP: NEGATIVE
LDLC SERPL CALC-MCNC: 110 MG/DL (ref 0–99)
LEUKOCYTE ESTERASE UR QL STRIP: NEGATIVE
MCH RBC QN AUTO: 30.8 PG (ref 26.6–33)
MCHC RBC AUTO-ENTMCNC: 33 G/DL (ref 31.5–35.7)
MCV RBC AUTO: 93 FL (ref 79–97)
MICRO URNS: NORMAL
NITRITE UR QL STRIP: NEGATIVE
PH UR STRIP: 6.5 [PH] (ref 5–7.5)
PLATELET # BLD AUTO: 245 X10E3/UL (ref 150–450)
POTASSIUM SERPL-SCNC: 4.4 MMOL/L (ref 3.5–5.2)
PROT SERPL-MCNC: 7 G/DL (ref 6–8.5)
PROT UR QL STRIP: NEGATIVE
RBC # BLD AUTO: 4.58 X10E6/UL (ref 3.77–5.28)
SL AMB VLDL CHOLESTEROL CALC: 17 MG/DL (ref 5–40)
SODIUM SERPL-SCNC: 142 MMOL/L (ref 134–144)
SP GR UR: 1.02 (ref 1–1.03)
TRIGL SERPL-MCNC: 96 MG/DL (ref 0–149)
TSH SERPL DL<=0.005 MIU/L-ACNC: 3.63 UIU/ML (ref 0.45–4.5)
UROBILINOGEN UR STRIP-ACNC: 0.2 MG/DL (ref 0.2–1)
WBC # BLD AUTO: 8.3 X10E3/UL (ref 3.4–10.8)

## 2024-05-24 ENCOUNTER — ANESTHESIA (OUTPATIENT)
Dept: ANESTHESIOLOGY | Facility: AMBULATORY SURGERY CENTER | Age: 69
End: 2024-05-24

## 2024-05-24 ENCOUNTER — ANESTHESIA EVENT (OUTPATIENT)
Dept: ANESTHESIOLOGY | Facility: AMBULATORY SURGERY CENTER | Age: 69
End: 2024-05-24

## 2024-05-24 ENCOUNTER — TELEPHONE (OUTPATIENT)
Dept: GASTROENTEROLOGY | Facility: CLINIC | Age: 69
End: 2024-05-24

## 2024-05-26 ENCOUNTER — HOSPITAL ENCOUNTER (OUTPATIENT)
Dept: MRI IMAGING | Facility: HOSPITAL | Age: 69
Discharge: HOME | End: 2024-05-26
Payer: MEDICARE

## 2024-05-26 DIAGNOSIS — G91.2 NORMAL PRESSURE HYDROCEPHALUS (HCC): ICD-10-CM

## 2024-05-26 DIAGNOSIS — R41.89 COGNITIVE CHANGE: ICD-10-CM

## 2024-05-26 PROCEDURE — 70551 MRI BRAIN STEM W/O DYE: CPT

## 2024-05-30 ENCOUNTER — APPOINTMENT (OUTPATIENT)
Dept: RADIOLOGY | Facility: HOSPITAL | Age: 69
End: 2024-05-30
Payer: MEDICARE

## 2024-05-30 ENCOUNTER — HOSPITAL ENCOUNTER (EMERGENCY)
Facility: HOSPITAL | Age: 69
Discharge: HOME | End: 2024-05-30
Attending: EMERGENCY MEDICINE
Payer: MEDICARE

## 2024-05-30 VITALS
DIASTOLIC BLOOD PRESSURE: 61 MMHG | SYSTOLIC BLOOD PRESSURE: 127 MMHG | OXYGEN SATURATION: 96 % | HEART RATE: 71 BPM | TEMPERATURE: 98.1 F | RESPIRATION RATE: 18 BRPM

## 2024-05-30 DIAGNOSIS — E03.9 HYPOTHYROIDISM, UNSPECIFIED TYPE: ICD-10-CM
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
## 2024-05-30 DIAGNOSIS — S20.212A RIB CONTUSION, LEFT, INITIAL ENCOUNTER: ICD-10-CM

## 2024-05-30 PROCEDURE — 99284 EMERGENCY DEPT VISIT MOD MDM: CPT

## 2024-05-30 PROCEDURE — 99284 EMERGENCY DEPT VISIT MOD MDM: CPT | Performed by: PHYSICIAN ASSISTANT

## 2024-05-30 PROCEDURE — 71101 X-RAY EXAM UNILAT RIBS/CHEST: CPT

## 2024-05-30 RX ORDER — LIDOCAINE 50 MG/G
1 PATCH TOPICAL ONCE
Status: DISCONTINUED | OUTPATIENT
Start: 2024-05-30 | End: 2024-05-30 | Stop reason: HOSPADM

## 2024-05-30 RX ADMIN — LIDOCAINE 5% 1 PATCH: 700 PATCH TOPICAL at 15:32

## 2024-05-30 NOTE — ED PROVIDER NOTES
History  Chief Complaint   Patient presents with    Fall     Patient presents to the ED with c/o fall today, states hit stomach on the ground while struggling with a fishing pole. States hit her face, denies thinners. Pain on left rib area      Patient is a 69 y/o F that presents to the ED with left rib pain after a fall that occurred 2 hours ago.  She states she was reaching for her fishing pole and fell forward hitting her left ribs on the ground.  She states she rolled and hit the back of her head.  She denies headache, dizziness, nausea or vomiting.  She denies any other injuries.  No aspirin or anticoagulants.  She did not take anything for pain.       History provided by:  Patient  Fall  Associated symptoms: no abdominal pain, no back pain, no headaches, no nausea, no neck pain and no vomiting        Prior to Admission Medications   Prescriptions Last Dose Informant Patient Reported? Taking?   acetaminophen (TYLENOL) 500 mg tablet  Self No No   Sig: Take 1 tablet (500 mg total) by mouth every 6 (six) hours as needed for mild pain or moderate pain   Patient not taking: Reported on 3/20/2024   atenolol (TENORMIN) 25 mg tablet  Self No No   Sig: Take 1 tablet (25 mg total) by mouth daily   fluticasone (FLONASE) 50 mcg/act nasal spray  Self No No   Si sprays into each nostril daily   fluticasone (FLONASE) 50 mcg/act nasal spray  Self No No   Si spray into each nostril daily   gabapentin (NEURONTIN) 300 mg capsule  Self No No   Sig: Take 1 capsule (300 mg total) by mouth 2 (two) times a day   ibuprofen (MOTRIN) 400 mg tablet  Self No No   Sig: Take 1 tablet (400 mg total) by mouth every 6 (six) hours as needed for mild pain or moderate pain   levothyroxine 150 mcg tablet   No No   Sig: TAKE 1 TABLET BY MOUTH ONCE DAILY IN THE EARLY MORNING   venlafaxine (EFFEXOR-XR) 150 mg 24 hr capsule  Self No No   Sig: Take 1 capsule (150 mg total) by mouth daily      Facility-Administered Medications: None       Past  Medical History:   Diagnosis Date    Allergic     Very Young teenager    Anemia     Anxiety     Don't know the date in my late twenties    Arthritis     Asthma     Depression     Don't remember the date    Fatty liver     Fibromyalgia     GERD (gastroesophageal reflux disease)     Not sure of the date .    Headache(784.0)     Almost every day    Hypertension     Irritable bowel syndrome     Lactose intolerance     Not sure that I have that but do have diarrhea after drink      milk or have ice cream.k    Memory loss     Memory is been a problem since 66 year of age    Mood swings     Obesity     Pneumonia     Not sure what the date  with in the last 10 years.    Thyroid activity decreased     Urinary tract infection     Have it often    Visual impairment     Have glasses for most of my life       Past Surgical History:   Procedure Laterality Date    BREAST BIOPSY Left     benign    BREAST LUMPECTOMY      Not sure of the date was not cancer.     SECTION  1982    CHOLECYSTECTOMY      COLONOSCOPY          HERNIA REPAIR      Don't remember date done when my gul blader removed.    HYSTERECTOMY      OOPHORECTOMY Right     TUBAL LIGATION      Done after my second child.       Family History   Problem Relation Age of Onset    Diabetes Father     Prostate cancer Father     Heart attack Father     Diabetes Other     Breast cancer Mother     Colon cancer Mother         She  2024    Colon polyps Mother     Hearing loss Mother     Stomach cancer Mother     Breast cancer Sister 60    Asthma Sister         Loida had asthma since she was a child    Colon polyps Sister     Hypothyroidism Sister     Irritable bowel syndrome Sister     Bone cancer Maternal Grandmother     Depression Paternal Grandmother     Colon cancer Paternal Grandfather     Seizures Sister     Stroke Sister         Passed away    Arthritis Sister     Colon polyps Brother     Cancer Paternal Aunt         Fathers  sister she had lung cancer.     I have reviewed and agree with the history as documented.    E-Cigarette/Vaping    E-Cigarette Use Never User      E-Cigarette/Vaping Substances    Nicotine No     THC No     CBD No     Flavoring No     Other No     Unknown No      Social History     Tobacco Use    Smoking status: Former     Current packs/day: 0.00     Average packs/day: 1.1 packs/day for 20.7 years (22.5 ttl pk-yrs)     Types: Cigarettes     Start date: 1971     Quit date: 1988     Years since quittin.8     Passive exposure: Never    Smokeless tobacco: Never    Tobacco comments:     Quite in    Vaping Use    Vaping status: Never Used   Substance Use Topics    Alcohol use: Yes     Alcohol/week: 2.0 standard drinks of alcohol     Types: 2 Glasses of wine per week     Comment: Maybe once a month    Drug use: Never       Review of Systems   Constitutional:  Negative for chills and fever.   Eyes:  Negative for visual disturbance.   Respiratory:  Negative for cough and shortness of breath.    Cardiovascular:         Left rib pain   Gastrointestinal:  Negative for abdominal pain, diarrhea, nausea and vomiting.   Musculoskeletal:  Negative for back pain and neck pain.   Skin:  Negative for color change and rash.   Neurological:  Negative for dizziness, speech difficulty, weakness, numbness and headaches.   All other systems reviewed and are negative.      Physical Exam  Physical Exam  Vitals and nursing note reviewed.   Constitutional:       General: She is not in acute distress.     Appearance: Normal appearance. She is well-developed and well-groomed. She is not ill-appearing or diaphoretic.   HENT:      Head: Normocephalic and atraumatic.      Right Ear: Hearing normal.      Left Ear: Hearing normal.   Eyes:      Conjunctiva/sclera: Conjunctivae normal.   Cardiovascular:      Rate and Rhythm: Normal rate and regular rhythm.      Heart sounds: Normal heart sounds.   Pulmonary:      Effort: Pulmonary  effort is normal.      Breath sounds: Normal breath sounds. No wheezing, rhonchi or rales.   Chest:      Chest wall: Tenderness (left lower lateral ribs) present. No deformity or crepitus.   Abdominal:      General: Abdomen is flat. Bowel sounds are normal.      Palpations: Abdomen is soft.      Tenderness: There is no abdominal tenderness.   Musculoskeletal:         General: No tenderness or signs of injury. Normal range of motion.      Cervical back: Normal range of motion. No spinous process tenderness or muscular tenderness.   Skin:     General: Skin is warm and dry.      Coloration: Skin is not jaundiced or pale.      Findings: No rash.   Neurological:      General: No focal deficit present.      Mental Status: She is alert and oriented to person, place, and time.      GCS: GCS eye subscore is 4. GCS verbal subscore is 5. GCS motor subscore is 6.      Motor: No weakness.   Psychiatric:         Behavior: Behavior is cooperative.         Vital Signs  ED Triage Vitals [05/30/24 1445]   Temperature Pulse Respirations Blood Pressure SpO2   98.1 °F (36.7 °C) 73 18 145/71 97 %      Temp Source Heart Rate Source Patient Position - Orthostatic VS BP Location FiO2 (%)   Oral Monitor Sitting Right arm --      Pain Score       8           Vitals:    05/30/24 1445 05/30/24 1530   BP: 145/71 127/61   Pulse: 73 71   Patient Position - Orthostatic VS: Sitting          Visual Acuity  Visual Acuity      Flowsheet Row Most Recent Value   L Pupil Size (mm) 3   R Pupil Size (mm) 3            ED Medications  Medications   lidocaine (LIDODERM) 5 % patch 1 patch (1 patch Topical Medication Applied 5/30/24 1532)       Diagnostic Studies  Results Reviewed       None                   XR ribs with pa chest min 3 views LEFT   ED Interpretation by Rere Turner PA-C (05/30 1537)   No acute fracture.                  Procedures  Procedures         ED Course                               SBIRT 22yo+      Flowsheet Row Most Recent  Value   Initial Alcohol Screen: US AUDIT-C     1. How often do you have a drink containing alcohol? 0 Filed at: 05/30/2024 1447   2. How many drinks containing alcohol do you have on a typical day you are drinking?  0 Filed at: 05/30/2024 1447   3a. Male UNDER 65: How often do you have five or more drinks on one occasion? 0 Filed at: 05/30/2024 1447   3b. FEMALE Any Age, or MALE 65+: How often do you have 4 or more drinks on one occassion? 0 Filed at: 05/30/2024 1447   Audit-C Score 0 Filed at: 05/30/2024 1447   BRICE: How many times in the past year have you...    Used an illegal drug or used a prescription medication for non-medical reasons? Never Filed at: 05/30/2024 1447                      Medical Decision Making  Patient with fall, left rib pain, will xray to r/o fracture.   No acute fracture, no pneumothorax.  Patient given rib contusion instructions and return precautions.     Amount and/or Complexity of Data Reviewed  Radiology: ordered and independent interpretation performed.    Risk  Prescription drug management.             Disposition  Final diagnoses:   Fall   Rib contusion, left, initial encounter     Time reflects when diagnosis was documented in both MDM as applicable and the Disposition within this note       Time User Action Codes Description Comment    5/30/2024  3:37 PM Rere Turner Add [W19.XXXA] Fall     5/30/2024  3:37 PM Rere Turner Add [S20.212A] Rib contusion, left, initial encounter           ED Disposition       ED Disposition   Discharge    Condition   Stable    Date/Time   Thu May 30, 2024 1537    Comment   Sarina Sheridan discharge to home/self care.                   Follow-up Information       Follow up With Specialties Details Why Contact Info Additional Information    Ebenezer Page, DO Family Medicine Schedule an appointment as soon as possible for a visit in 1 week For recheck [id number] [facility]  [address] [address] [phone]        [facility]  [facility] Emergency Department Emergency Medicine Go to  If symptoms worsen [address] [facility] [address] [phone] [facility], [address] [place], [place] [address]            Discharge Medication List as of 5/30/2024  3:38 PM        CONTINUE these medications which have NOT CHANGED    Details   acetaminophen (TYLENOL) 500 mg tablet Take 1 tablet (500 mg total) by mouth every 6 (six) hours as needed for mild pain or moderate pain, Starting Sun 3/10/2024, Print      atenolol (TENORMIN) 25 mg tablet Take 1 tablet (25 mg total) by mouth daily, Starting Wed 3/6/2024, Normal      !! fluticasone (FLONASE) 50 mcg/act nasal spray 2 sprays into each nostril daily, Starting Tue 11/28/2023, Normal      !! fluticasone (FLONASE) 50 mcg/act nasal spray 1 spray into each nostril daily, Starting Mon 1/22/2024, Normal      gabapentin (NEURONTIN) 300 mg capsule Take 1 capsule (300 mg total) by mouth 2 (two) times a day, Starting Tue 1/16/2024, Normal      ibuprofen (MOTRIN) 400 mg tablet Take 1 tablet (400 mg total) by mouth every 6 (six) hours as needed for mild pain or moderate pain, Starting Sun 3/10/2024, Print      levothyroxine 150 mcg tablet TAKE 1 TABLET BY MOUTH ONCE DAILY IN THE EARLY MORNING, Normal      venlafaxine (EFFEXOR-XR) 150 mg 24 hr capsule Take 1 capsule (150 mg total) by mouth daily, Starting Wed 3/6/2024, Normal       !! - Potential duplicate medications found. Please discuss with provider.          No discharge procedures on file.    PDMP Review       None            ED Provider  Electronically Signed by             Rere Turner PA-C  05/30/24 3050

## 2024-05-30 NOTE — DISCHARGE INSTRUCTIONS
Rest, ice for next 2 days.  Tylenol/motrin for discomfort.  Over the counter lidocaine patches.  Take deep breaths to prevent lung infection.  Follow up with family doctor in 1 week for recheck.  Return to ER if symptoms worsen.

## 2024-05-31 RX ORDER — LEVOTHYROXINE SODIUM 0.15 MG/1
TABLET ORAL
Qty: 30 TABLET | Refills: 0 | Status: SHIPPED | OUTPATIENT
Start: 2024-05-31

## 2024-06-04 LAB
ALBUMIN SERPL-MCNC: 4.4 G/DL (ref 3.9–4.9)
ALP SERPL-CCNC: 69 IU/L (ref 44–121)
ALT SERPL-CCNC: 34 IU/L (ref 0–32)
AST SERPL-CCNC: 19 IU/L (ref 0–40)
BILIRUB DIRECT SERPL-MCNC: 0.12 MG/DL (ref 0–0.4)
BILIRUB SERPL-MCNC: 0.4 MG/DL (ref 0–1.2)
PROT SERPL-MCNC: 6.6 G/DL (ref 6–8.5)

## 2024-06-07 ENCOUNTER — ANESTHESIA (OUTPATIENT)
Dept: GASTROENTEROLOGY | Facility: AMBULATORY SURGERY CENTER | Age: 69
End: 2024-06-07

## 2024-06-07 ENCOUNTER — HOSPITAL ENCOUNTER (OUTPATIENT)
Dept: GASTROENTEROLOGY | Facility: AMBULATORY SURGERY CENTER | Age: 69
Discharge: HOME | End: 2024-06-07
Attending: STUDENT IN AN ORGANIZED HEALTH CARE EDUCATION/TRAINING PROGRAM
Payer: MEDICARE

## 2024-06-07 ENCOUNTER — ANESTHESIA EVENT (OUTPATIENT)
Dept: GASTROENTEROLOGY | Facility: AMBULATORY SURGERY CENTER | Age: 69
End: 2024-06-07

## 2024-06-07 VITALS
SYSTOLIC BLOOD PRESSURE: 162 MMHG | WEIGHT: 250 LBS | TEMPERATURE: 98.4 F | DIASTOLIC BLOOD PRESSURE: 71 MMHG | BODY MASS INDEX: 39.24 KG/M2 | OXYGEN SATURATION: 97 % | RESPIRATION RATE: 17 BRPM | HEIGHT: 67 IN | HEART RATE: 60 BPM

## 2024-06-07 DIAGNOSIS — K21.9 GASTROESOPHAGEAL REFLUX DISEASE WITHOUT ESOPHAGITIS: ICD-10-CM

## 2024-06-07 DIAGNOSIS — R11.0 NAUSEA: ICD-10-CM

## 2024-06-07 PROCEDURE — 43239 EGD BIOPSY SINGLE/MULTIPLE: CPT | Performed by: STUDENT IN AN ORGANIZED HEALTH CARE EDUCATION/TRAINING PROGRAM

## 2024-06-07 PROCEDURE — 88305 TISSUE EXAM BY PATHOLOGIST: CPT | Performed by: PATHOLOGY

## 2024-06-07 RX ORDER — PROPOFOL 10 MG/ML
INJECTION, EMULSION INTRAVENOUS AS NEEDED
Status: DISCONTINUED | OUTPATIENT
Start: 2024-06-07 | End: 2024-06-07

## 2024-06-07 RX ORDER — LIDOCAINE HYDROCHLORIDE 20 MG/ML
INJECTION, SOLUTION EPIDURAL; INFILTRATION; INTRACAUDAL; PERINEURAL AS NEEDED
Status: DISCONTINUED | OUTPATIENT
Start: 2024-06-07 | End: 2024-06-07

## 2024-06-07 RX ORDER — SODIUM CHLORIDE, SODIUM LACTATE, POTASSIUM CHLORIDE, CALCIUM CHLORIDE 600; 310; 30; 20 MG/100ML; MG/100ML; MG/100ML; MG/100ML
INJECTION, SOLUTION INTRAVENOUS CONTINUOUS PRN
Status: DISCONTINUED | OUTPATIENT
Start: 2024-06-07 | End: 2024-06-07

## 2024-06-07 RX ORDER — SODIUM CHLORIDE, SODIUM LACTATE, POTASSIUM CHLORIDE, CALCIUM CHLORIDE 600; 310; 30; 20 MG/100ML; MG/100ML; MG/100ML; MG/100ML
50 INJECTION, SOLUTION INTRAVENOUS CONTINUOUS
Status: DISCONTINUED | OUTPATIENT
Start: 2024-06-07 | End: 2024-06-11 | Stop reason: HOSPADM

## 2024-06-07 RX ADMIN — SODIUM CHLORIDE, SODIUM LACTATE, POTASSIUM CHLORIDE, CALCIUM CHLORIDE: 600; 310; 30; 20 INJECTION, SOLUTION INTRAVENOUS at 11:05

## 2024-06-07 RX ADMIN — PROPOFOL 70 MG: 10 INJECTION, EMULSION INTRAVENOUS at 11:08

## 2024-06-07 RX ADMIN — PROPOFOL 30 MG: 10 INJECTION, EMULSION INTRAVENOUS at 11:12

## 2024-06-07 RX ADMIN — LIDOCAINE HYDROCHLORIDE 100 MG: 20 INJECTION, SOLUTION EPIDURAL; INFILTRATION; INTRACAUDAL; PERINEURAL at 11:08

## 2024-06-07 RX ADMIN — SODIUM CHLORIDE, SODIUM LACTATE, POTASSIUM CHLORIDE, CALCIUM CHLORIDE 50 ML/HR: 600; 310; 30; 20 INJECTION, SOLUTION INTRAVENOUS at 10:56

## 2024-06-07 RX ADMIN — PROPOFOL 20 MG: 10 INJECTION, EMULSION INTRAVENOUS at 11:14

## 2024-06-07 NOTE — H&P
History and Physical - [facility]  Sarina Sheridan 68 y.o. female MRN: [id number]    HPI: Sarina Sheridan is a 68 y.o. female who presents for EGD for GERD.    REVIEW OF SYSTEMS: Per the HPI, and otherwise unremarkable.    Historical Information   Past Medical History:   Diagnosis Date    Allergic     Very Young teenager    Anemia     Anxiety     Don't know the date in my late twenties    Arthritis     Asthma     Depression     Don't remember the date    Fatty liver     Fibromyalgia     GERD (gastroesophageal reflux disease)     Not sure of the date .    Headache(784.0)     Almost every day    Hypertension     Irritable bowel syndrome     Lactose intolerance     Not sure that I have that but do have diarrhea after drink      milk or have ice cream.k    Memory loss     Memory is been a problem since 66 year of age    Mood swings     Obesity     Pneumonia     Not sure what the date  with in the last 10 years.    Thyroid activity decreased     Urinary tract infection     Have it often    Visual impairment     Have glasses for most of my life     Past Surgical History:   Procedure Laterality Date    BREAST BIOPSY Left     benign    BREAST LUMPECTOMY      Not sure of the date was not cancer.     SECTION  1982    CHOLECYSTECTOMY      COLONOSCOPY          HERNIA REPAIR      Don't remember date done when my gul blader removed.    HYSTERECTOMY      OOPHORECTOMY Right     TUBAL LIGATION      Done after my second child.     Social History   Social History     Substance and Sexual Activity   Alcohol Use Yes    Alcohol/week: 2.0 standard drinks of alcohol    Types: 2 Glasses of wine per week    Comment: Maybe once a month     Social History     Substance and Sexual Activity   Drug Use Never     Social History     Tobacco Use   Smoking Status Former    Current packs/day: 0.00    Average packs/day: 1.1 packs/day for 20.7 years (22.5 ttl pk-yrs)    Types: Cigarettes    Start  "date: 1971    Quit date: 1988    Years since quittin.8    Passive exposure: Never   Smokeless Tobacco Never   Tobacco Comments    Quite in      Family History   Problem Relation Age of Onset    Diabetes Father     Prostate cancer Father     Heart attack Father     Diabetes Other     Breast cancer Mother     Colon cancer Mother         She  2024    Colon polyps Mother     Hearing loss Mother     Stomach cancer Mother     Breast cancer Sister 60    Asthma Sister         Loida had asthma since she was a child    Colon polyps Sister     Hypothyroidism Sister     Irritable bowel syndrome Sister     Bone cancer Maternal Grandmother     Depression Paternal Grandmother     Colon cancer Paternal Grandfather     Seizures Sister     Stroke Sister         Passed away    Arthritis Sister     Colon polyps Brother     Cancer Paternal Aunt         Fathers sister she had lung cancer.       Meds/Allergies       Current Outpatient Medications:     atenolol (TENORMIN) 25 mg tablet    fluticasone (FLONASE) 50 mcg/act nasal spray    gabapentin (NEURONTIN) 300 mg capsule    ibuprofen (MOTRIN) 400 mg tablet    levothyroxine 150 mcg tablet    venlafaxine (EFFEXOR-XR) 150 mg 24 hr capsule    acetaminophen (TYLENOL) 500 mg tablet    fluticasone (FLONASE) 50 mcg/act nasal spray    Current Facility-Administered Medications:     lactated ringers infusion, 50 mL/hr, Intravenous, Continuous, 50 mL/hr at 24 1056    Allergies   Allergen Reactions    Cefuroxime Hives    Celecoxib Other (See Comments)    Clindamycin Other (See Comments)    Duloxetine Hcl Other (See Comments)    Gadolinium Derivatives Hives     Hives after MRI Brain [facility] 2018 per patient    Iodinated Contrast Media Itching and Swelling    Latex Itching     redness    Penicillins Other (See Comments)     Unknown         Objective     /64   Pulse 71   Temp 98.4 °F (36.9 °C) (Temporal)   Resp (!) 10   Ht 5' 7\" (1.702 m)   Wt 113 kg (250 lb)  "  SpO2 95%   BMI 39.16 kg/m²     PHYSICAL EXAM    General Appearance: NAD, cooperative, alert  Eyes: Anicteric  GI:  Soft, non-tender, non-distended; normal bowel sounds; no masses, no organomegaly   Rectal: Deferred until procedure  Musculoskeletal: No edema.  Skin:  No jaundice    ASSESSMENT/PLAN:  This is a 68 y.o. year old female here for EGD, and she is stable and optimized for her procedure.

## 2024-06-07 NOTE — ANESTHESIA POSTPROCEDURE EVALUATION
Post-Op Assessment Note    CV Status:  Stable  Pain Score: 0    Pain management: adequate       Mental Status:  Sleepy   Hydration Status:  Stable   PONV Controlled:  None   Airway Patency:  Patent     Post Op Vitals Reviewed: Yes    No anethesia notable event occurred.    Staff: CRNA, Anesthesiologist               BP   167/74   Temp 36.6   Pulse 67   Resp 14   SpO2 92

## 2024-06-07 NOTE — ANESTHESIA PREPROCEDURE EVALUATION
Procedure:  EGD    Relevant Problems   CARDIO   (+) Dilatation of aorta (HCC)   (+) Thoracic ascending aortic aneurysm (HCC)      ENDO   (+) Hypothyroidism      GI/HEPATIC   (+) Fatty liver      MUSCULOSKELETAL   (+) Fibromyalgia      NEURO/PSYCH   (+) Anxiety disorder   (+) Depression   (+) Fibromyalgia   (+) Moderate recurrent major depression (HCC)        Physical Exam    Airway    Mallampati score: II  TM Distance: >3 FB  Neck ROM: full     Dental   No notable dental hx     Cardiovascular      Pulmonary      Other Findings  post-pubertal.      Anesthesia Plan  ASA Score- 3     Anesthesia Type- IV sedation with anesthesia with ASA Monitors.         Additional Monitors:     Airway Plan:            Plan Factors-Exercise tolerance (METS): >4 METS.    Chart reviewed.    Patient summary reviewed.    Patient is not a current smoker.              Induction- intravenous.    Postoperative Plan-         Informed Consent- Anesthetic plan and risks discussed with patient.  I personally reviewed this patient with the CRNA. Discussed and agreed on the Anesthesia Plan with the CRNA..

## 2024-06-12 PROCEDURE — 88305 TISSUE EXAM BY PATHOLOGIST: CPT | Performed by: PATHOLOGY

## 2024-06-19 DIAGNOSIS — K52.9 CHRONIC DIARRHEA: Primary | ICD-10-CM

## 2024-06-24 ENCOUNTER — APPOINTMENT (OUTPATIENT)
Dept: RADIOLOGY | Facility: CLINIC | Age: 69
End: 2024-06-24
Payer: MEDICARE

## 2024-06-24 DIAGNOSIS — R19.7 DIARRHEA, UNSPECIFIED TYPE: Primary | ICD-10-CM

## 2024-06-24 DIAGNOSIS — K52.9 CHRONIC DIARRHEA: ICD-10-CM

## 2024-06-24 PROCEDURE — 74018 RADEX ABDOMEN 1 VIEW: CPT

## 2024-06-25 DIAGNOSIS — E03.9 HYPOTHYROIDISM, UNSPECIFIED TYPE: ICD-10-CM

## 2024-06-25 RX ORDER — LEVOTHYROXINE SODIUM 0.15 MG/1
TABLET ORAL
Qty: 30 TABLET | Refills: 5 | Status: SHIPPED | OUTPATIENT
Start: 2024-06-25

## 2024-08-01 ENCOUNTER — APPOINTMENT (OUTPATIENT)
Dept: RADIOLOGY | Facility: CLINIC | Age: 69
End: 2024-08-01
Payer: MEDICARE

## 2024-08-01 DIAGNOSIS — M79.671 RIGHT FOOT PAIN: ICD-10-CM

## 2024-08-01 PROCEDURE — 73630 X-RAY EXAM OF FOOT: CPT

## 2024-08-23 DIAGNOSIS — F33.1 MODERATE RECURRENT MAJOR DEPRESSION (HCC): ICD-10-CM

## 2024-08-23 RX ORDER — VENLAFAXINE HYDROCHLORIDE 150 MG/1
150 CAPSULE, EXTENDED RELEASE ORAL DAILY
Qty: 30 CAPSULE | Refills: 5 | Status: SHIPPED | OUTPATIENT
Start: 2024-08-23

## 2024-09-30 DIAGNOSIS — I71.21 ANEURYSM OF ASCENDING AORTA WITHOUT RUPTURE (HCC): ICD-10-CM

## 2024-10-01 RX ORDER — ATENOLOL 25 MG/1
25 TABLET ORAL DAILY
Qty: 30 TABLET | Refills: 5 | Status: SHIPPED | OUTPATIENT
Start: 2024-10-01

## 2024-10-10 ENCOUNTER — OFFICE VISIT (OUTPATIENT)
Dept: NEUROLOGY | Facility: CLINIC | Age: 69
End: 2024-10-10
Payer: MEDICARE

## 2024-10-10 VITALS
BODY MASS INDEX: 39.71 KG/M2 | WEIGHT: 253 LBS | SYSTOLIC BLOOD PRESSURE: 138 MMHG | HEIGHT: 67 IN | HEART RATE: 68 BPM | DIASTOLIC BLOOD PRESSURE: 80 MMHG

## 2024-10-10 DIAGNOSIS — R06.83 SNORING: Primary | ICD-10-CM

## 2024-10-10 DIAGNOSIS — R68.89 FORGETFULNESS: ICD-10-CM

## 2024-10-10 PROCEDURE — 99205 OFFICE O/P NEW HI 60 MIN: CPT | Performed by: PSYCHIATRY & NEUROLOGY

## 2024-10-10 NOTE — PATIENT INSTRUCTIONS
Monitor BP < 120/80   Keep LDL (bad cholesterol) < 100   Continue good job with sugar control   Referral to sleep medicine     Lifestyle recommendations:  Regular Physical Exercise: Physical activity has been linked to improved cognitive function and reduced risk of cognitive decline. Aim for a combination of aerobic exercise (such as walking, swimming, or cycling) and strength training exercises several times a week.  Healthy Diet: A balanced diet rich in fruits, vegetables, whole grains, lean proteins, and healthy fats can support brain health. Foods high in antioxidants, omega-3 fatty acids, and vitamins B, C, D, and E may have neuroprotective effects.  Mental Stimulation: Engage in activities that challenge your brain, such as reading, puzzles, crossword puzzles, learning a new language, playing musical instruments, or engaging in hobbies that require problem-solving and critical thinking.  Social Engagement: Maintain social connections and participate in social activities with friends, family, and community groups. Social interaction can help reduce stress, improve mood, and stimulate cognitive function.  Quality Sleep: Prioritize good sleep hygiene and aim for 7-9 hours of quality sleep per night. Poor sleep can impair cognitive function and memory consolidation.  Stress Management: Practice stress-reduction techniques such as mindfulness, meditation, deep breathing exercises, or yoga to manage stress levels. Chronic stress can have detrimental effects on cognitive function.  Intellectual Curiosity: Stay curious and open to learning new things throughout life. Explore new topics, take up new hobbies or interests, and continue to challenge yourself intellectually.  Maintain Overall Health: Manage chronic health conditions such as hypertension, diabetes, and high cholesterol through regular medical check-ups, medication adherence, and lifestyle modifications. These conditions can impact cognitive function if  left untreated.  Limit Alcohol and Avoid Smoking: Excessive alcohol consumption and smoking can have negative effects on brain health and increase the risk of cognitive decline. Limit alcohol intake and avoid smoking to promote brain health.  Stay mentally and socially active: Engage in activities that stimulate your mind and maintain social connections. This could include volunteering, joining clubs or groups, participating in community events, or taking up new hobbies or interests.

## 2024-10-10 NOTE — PROGRESS NOTES
Ambulatory Visit  Name: Sarina Sheridan      : 1955      MRN: [id number]  Encounter Provider: Danika Gale MD  Encounter Date: 10/10/2024   Encounter department: [facility] [place]    Assessment & Plan  Forgetfulness  Sarina Sheridan is a very pleasant 68 y.o. female with history of anxiety, obesity, depression, aortic dilatation, fibromyalgia, hypothyroidism and thoracic ascending aortic aneurysm who presents for cognitive evaluation.     Patient reports that since 3 years ago she started having problems with forgetfulness and word finding difficulties.  Examples include walking into her room and forgetting what she is going to do.  She has difficulty with naming things, places and names.  She has placed herself in dangerous situations such as leaving the stove on in the past.  Patient has a history of anxiety and depression which both are treated.  Patient is independent in all of her ADLs.  She does report snoring.  Patient does not have any problems with driving her finances.  In terms of family history she reports having a father with history of dementia and a sister with multiple strokes.  Available workup including MRI brain without contrast done earlier this year showed findings suggesting early amyloid angiopathy.  [place] today .    At this time, MOCA 1 point off of being within the normal range. Not concerned for an ongoing neurodegenerative process. Modifiable factors include potential MARJ. Her recent MRI shows concern for amyloid angiopathy. Further supporting the diagnosis, includes family history of this in her sister with multiple strokes. Would need to monitor this closely to prevent vascular dementia     Plan:  Monitor BP < 120/80   Keep LDL (bad cholesterol) < 100   Continue good job with glycemic control   Referral to sleep medicine to evaluate for MARJ  Lifestyle recommendations:  Regular Physical Exercise: Physical activity has been linked to improved  cognitive function and reduced risk of cognitive decline. Aim for a combination of aerobic exercise (such as walking, swimming, or cycling) and strength training exercises several times a week.  Healthy Diet: A balanced diet rich in fruits, vegetables, whole grains, lean proteins, and healthy fats can support brain health. Foods high in antioxidants, omega-3 fatty acids, and vitamins B, C, D, and E may have neuroprotective effects.  Mental Stimulation: Engage in activities that challenge your brain, such as reading, puzzles, crossword puzzles, learning a new language, playing musical instruments, or engaging in hobbies that require problem-solving and critical thinking.  Social Engagement: Maintain social connections and participate in social activities with friends, family, and community groups. Social interaction can help reduce stress, improve mood, and stimulate cognitive function.  Quality Sleep: Prioritize good sleep hygiene and aim for 7-9 hours of quality sleep per night. Poor sleep can impair cognitive function and memory consolidation.  Stress Management: Practice stress-reduction techniques such as mindfulness, meditation, deep breathing exercises, or yoga to manage stress levels. Chronic stress can have detrimental effects on cognitive function.  Intellectual Curiosity: Stay curious and open to learning new things throughout life. Explore new topics, take up new hobbies or interests, and continue to challenge yourself intellectually.  Maintain Overall Health: Manage chronic health conditions such as hypertension, diabetes, and high cholesterol through regular medical check-ups, medication adherence, and lifestyle modifications. These conditions can impact cognitive function if left untreated.  Limit Alcohol and Avoid Smoking: Excessive alcohol consumption and smoking can have negative effects on brain health and increase the risk of cognitive decline. Limit alcohol intake and avoid smoking to promote  brain health.  Stay mentally and socially active: Engage in activities that stimulate your mind and maintain social connections. This could include volunteering, joining clubs or groups, participating in community events, or taking up new hobbies or interests.  Follow up in 6 months         Snoring    Orders:    Ambulatory Referral to Sleep Medicine; Future      Patient Instructions   Monitor BP < 120/80   Keep LDL (bad cholesterol) < 100   Continue good job with sugar control   Referral to sleep medicine     Lifestyle recommendations:  Regular Physical Exercise: Physical activity has been linked to improved cognitive function and reduced risk of cognitive decline. Aim for a combination of aerobic exercise (such as walking, swimming, or cycling) and strength training exercises several times a week.  Healthy Diet: A balanced diet rich in fruits, vegetables, whole grains, lean proteins, and healthy fats can support brain health. Foods high in antioxidants, omega-3 fatty acids, and vitamins B, C, D, and E may have neuroprotective effects.  Mental Stimulation: Engage in activities that challenge your brain, such as reading, puzzles, crossword puzzles, learning a new language, playing musical instruments, or engaging in hobbies that require problem-solving and critical thinking.  Social Engagement: Maintain social connections and participate in social activities with friends, family, and community groups. Social interaction can help reduce stress, improve mood, and stimulate cognitive function.  Quality Sleep: Prioritize good sleep hygiene and aim for 7-9 hours of quality sleep per night. Poor sleep can impair cognitive function and memory consolidation.  Stress Management: Practice stress-reduction techniques such as mindfulness, meditation, deep breathing exercises, or yoga to manage stress levels. Chronic stress can have detrimental effects on cognitive function.  Intellectual Curiosity: Stay curious and open to  learning new things throughout life. Explore new topics, take up new hobbies or interests, and continue to challenge yourself intellectually.  Maintain Overall Health: Manage chronic health conditions such as hypertension, diabetes, and high cholesterol through regular medical check-ups, medication adherence, and lifestyle modifications. These conditions can impact cognitive function if left untreated.  Limit Alcohol and Avoid Smoking: Excessive alcohol consumption and smoking can have negative effects on brain health and increase the risk of cognitive decline. Limit alcohol intake and avoid smoking to promote brain health.  Stay mentally and socially active: Engage in activities that stimulate your mind and maintain social connections. This could include volunteering, joining clubs or groups, participating in community events, or taking up new hobbies or interests.     History of Present Illness   HPI    Sarina Sheridan is a very pleasant 68 y.o. female with history of anxiety, obesity, depression, aortic dilatation, fibromyalgia, hypothyroidism and thoracic ascending aortic aneurysm who presents for cognitive evaluation.     Memory history    History gathered by Patient  Patients highest level of education:   Patients current or past occupation: Retired, soddered  [employer]   Living situation: With  in a mobile home, kids are grown, has grandchildren    Onset: 3 years ago    Major events: No   Progression:  Gradual   Timing throughout day: Morning when she first wakes up   Triggers: Stress    Examples:   Patient or caregiver reported: Would walk into a room and forget where she was going to do. Sometimes can't remember what things are called. Difficulty with names and places   Forgetfulness: Yes  Word finding difficulties: Yes    Repetition: No      Personality:  Changes in mood, personality or interactions with others: No   Aggression: Has always had an aggressive behavior, salcido   Strange behavior: No    History of anxiety or depression: Both, venlafaxine.   Dangerous situations: Has left the stove on in the past     Dementia ROS:  Weakness: In her legs 2/2 to pain from arthritis    Tremor:When she gets hungry   Gait/balance issues: Knee problems   Hallucinations:No   History of stroke: Amyloid   Visual disturbances: Vision problems at baseline       ADLs:  Shower: Independent   Dress themselves: Independent    Feed themselves:Independent   Toileting: Independent   Sleep:    Hours of sleep: 5-7 hours  Feels rested when wakes up?:If she gets the 7 hours  Difficulty falling asleep/staying asleep?: Falling asleep   Acting out dreams: No   Snoring?: Yes   Sleep study?: No   Problems with bowel or bladder function: Constipation, has IBS   Hearing problems: No   Driving: No problems   Finances: No problems     Social:  Alcohol: Rarely   Smoking: No   Substance abuse: No     Family history   History of memory problems:     Father had dementia,  at age 86  Sister with multiple strokes and unspecified brain tumors       Medication review:   Venlafaxine 150 mg   Levothyroxine 150 mcg  Gabapentin 300 mg bid   Atenolol 25 mg       Workup:    MRI brain without contrast (24) : White matter changes suggestive of chronic microangiopathy.  No acute intracranial pathology. Scattered small foci of susceptibility primarily towards the frontal parietal vertex, nonspecific in appearance possibly representing early amyloid angiopathy.     No MR findings to suggest NPH as clinically questioned.    MRI brain IAC wo and w contrast (2018): No vestibular schwannoma or inner ear abnormality. Normal posterior fossa. Abnormal leptomeningeal enhancement in the bilateral frontal, parietal and   occipital and right temporal sulci. No left meningeal enhancement in the   cerebellopontine angle cisterns or the internal auditory canals. The   differential diagnosis for these findings includes neoplastic processes such as  lymphoma or  metastatic carcinomatosis, infections meningitis and granulomatous disease such as neurosarcoid.    3. Multiple chronic microhemorrhages in the cerebral hemispheres and multiple areas of demyelination or gliosis in the cerebral white matter. This constellation of findings can be seen in patients with hypertension, amyloid angiopathy, CNS vasculitis or the sequela of radiation.       Review of Systems    Constitutional:  Negative for appetite change, fatigue and fever.   HENT: Negative.  Negative for hearing loss, tinnitus, trouble swallowing and voice change.    Eyes: Negative.  Negative for photophobia, pain and visual disturbance.   Respiratory: Negative.  Negative for shortness of breath.    Cardiovascular: Negative.  Negative for palpitations.   Gastrointestinal: Negative.  Negative for nausea and vomiting.   Endocrine: Negative.  Negative for cold intolerance.   Genitourinary: Negative.  Negative for dysuria, frequency and urgency.   Musculoskeletal:  Negative for back pain, gait problem, myalgias, neck pain and neck stiffness.   Skin: Negative.  Negative for rash.   Allergic/Immunologic: Negative.    Neurological: Negative.  Negative for dizziness, tremors, seizures, syncope, facial asymmetry, speech difficulty, weakness, light-headedness, numbness and headaches.   Hematological: Negative.  Does not bruise/bleed easily.   Psychiatric/Behavioral:  Positive for confusion. Negative for hallucinations and sleep disturbance.    I have personally reviewed the MA's review of systems and made changes as necessary.    Medical History Reviewed by provider this encounter:  Meds  Problems       Current Outpatient Medications on File Prior to Visit   Medication Sig Dispense Refill    acetaminophen (TYLENOL) 500 mg tablet Take 1 tablet (500 mg total) by mouth every 6 (six) hours as needed for mild pain or moderate pain 30 tablet 0    atenolol (TENORMIN) 25 mg tablet Take 1 tablet by mouth once daily 30 tablet 5     "fluticasone (FLONASE) 50 mcg/act nasal spray 2 sprays into each nostril daily 16 g 5    gabapentin (NEURONTIN) 300 mg capsule Take 1 capsule (300 mg total) by mouth 2 (two) times a day 60 capsule 5    ibuprofen (MOTRIN) 400 mg tablet Take 1 tablet (400 mg total) by mouth every 6 (six) hours as needed for mild pain or moderate pain 30 tablet 0    levothyroxine 150 mcg tablet TAKE 1 TABLET BY MOUTH ONCE DAILY IN THE EARLY MORNING 30 tablet 5    venlafaxine (EFFEXOR-XR) 150 mg 24 hr capsule Take 1 capsule by mouth once daily 30 capsule 5     No current facility-administered medications on file prior to visit.      Objective     /80 (BP Location: Left arm, Patient Position: Sitting, Cuff Size: Large)   Pulse 68   Ht 5' 7\" (1.702 m)   Wt 115 kg (253 lb)   BMI 39.63 kg/m²     Physical Exam  Neurologic Exam     Mental Status   [place] Cognitive Assessment Exam:    Visuospatial/executive function   5/5  Identification   3/3  Attention        Digits   2/2       Letters   1/1       Serial 7s   2/3  Language          Repetition   1/2       Fluency   1/1  Abstraction   2/2  Delayed recall   1/5  Orientation   6/6  Total   25/30             Results/Data:      I personally reviewed the following image studies in PACS and associated radiology reports: MRI brain. My interpretation of the radiology images/reports is: amyloid angiopathy .    Administrative Statements   I have spent a total time of 60  minutes in caring for this patient on the day of the visit/encounter including Diagnostic results, Prognosis, Risks and benefits of tx options, Instructions for management, Patient and family education, Importance of tx compliance, Risk factor reductions, Impressions, Counseling / Coordination of care, Documenting in the medical record, Reviewing / ordering tests, medicine, procedures  , and Obtaining or reviewing history  .    "

## 2024-10-22 PROBLEM — G91.2 NORMAL PRESSURE HYDROCEPHALUS (HCC): Status: RESOLVED | Noted: 2023-10-30 | Resolved: 2024-10-22

## 2024-10-22 PROBLEM — R68.89 FORGETFULNESS: Status: ACTIVE | Noted: 2024-10-22

## 2024-10-22 NOTE — ASSESSMENT & PLAN NOTE
Sarina Sheridan is a very pleasant 68 y.o. female with history of anxiety, obesity, depression, aortic dilatation, fibromyalgia, hypothyroidism and thoracic ascending aortic aneurysm who presents for cognitive evaluation.     Patient reports that since 3 years ago she started having problems with forgetfulness and word finding difficulties.  Examples include walking into her room and forgetting what she is going to do.  She has difficulty with naming things, places and names.  She has placed herself in dangerous situations such as leaving the stove on in the past.  Patient has a history of anxiety and depression which both are treated.  Patient is independent in all of her ADLs.  She does report snoring.  Patient does not have any problems with driving her finances.  In terms of family history she reports having a father with history of dementia and a sister with multiple strokes.  Available workup including MRI brain without contrast done earlier this year showed findings suggesting early amyloid angiopathy.  [place] today 25/30.    At this time, MOCA 1 point off of being within the normal range. Not concerned for an ongoing neurodegenerative process. Modifiable factors include potential MARJ. Her recent MRI shows concern for amyloid angiopathy. Further supporting the diagnosis, includes family history of this in her sister with multiple strokes. Would need to monitor this closely to prevent vascular dementia     Plan:  Monitor BP < 120/80   Keep LDL (bad cholesterol) < 100   Continue good job with glycemic control   Referral to sleep medicine to evaluate for MARJ  Lifestyle recommendations:  Regular Physical Exercise: Physical activity has been linked to improved cognitive function and reduced risk of cognitive decline. Aim for a combination of aerobic exercise (such as walking, swimming, or cycling) and strength training exercises several times a week.  Healthy Diet: A balanced diet rich in fruits, vegetables, whole  grains, lean proteins, and healthy fats can support brain health. Foods high in antioxidants, omega-3 fatty acids, and vitamins B, C, D, and E may have neuroprotective effects.  Mental Stimulation: Engage in activities that challenge your brain, such as reading, puzzles, crossword puzzles, learning a new language, playing musical instruments, or engaging in hobbies that require problem-solving and critical thinking.  Social Engagement: Maintain social connections and participate in social activities with friends, family, and community groups. Social interaction can help reduce stress, improve mood, and stimulate cognitive function.  Quality Sleep: Prioritize good sleep hygiene and aim for 7-9 hours of quality sleep per night. Poor sleep can impair cognitive function and memory consolidation.  Stress Management: Practice stress-reduction techniques such as mindfulness, meditation, deep breathing exercises, or yoga to manage stress levels. Chronic stress can have detrimental effects on cognitive function.  Intellectual Curiosity: Stay curious and open to learning new things throughout life. Explore new topics, take up new hobbies or interests, and continue to challenge yourself intellectually.  Maintain Overall Health: Manage chronic health conditions such as hypertension, diabetes, and high cholesterol through regular medical check-ups, medication adherence, and lifestyle modifications. These conditions can impact cognitive function if left untreated.  Limit Alcohol and Avoid Smoking: Excessive alcohol consumption and smoking can have negative effects on brain health and increase the risk of cognitive decline. Limit alcohol intake and avoid smoking to promote brain health.  Stay mentally and socially active: Engage in activities that stimulate your mind and maintain social connections. This could include volunteering, joining clubs or groups, participating in community events, or taking up new hobbies or  interests.  Follow up in 6 months

## 2024-10-29 ENCOUNTER — RA CDI HCC (OUTPATIENT)
Dept: OTHER | Facility: HOSPITAL | Age: 69
End: 2024-10-29

## 2024-10-29 PROBLEM — F33.1 MODERATE RECURRENT MAJOR DEPRESSION (HCC): Status: ACTIVE | Noted: 2019-01-08

## 2024-11-05 ENCOUNTER — OFFICE VISIT (OUTPATIENT)
Dept: FAMILY MEDICINE CLINIC | Facility: CLINIC | Age: 69
End: 2024-11-05
Payer: MEDICARE

## 2024-11-05 VITALS
RESPIRATION RATE: 16 BRPM | OXYGEN SATURATION: 97 % | SYSTOLIC BLOOD PRESSURE: 128 MMHG | HEART RATE: 70 BPM | BODY MASS INDEX: 39.52 KG/M2 | DIASTOLIC BLOOD PRESSURE: 80 MMHG | HEIGHT: 67 IN | WEIGHT: 251.8 LBS | TEMPERATURE: 97.6 F

## 2024-11-05 DIAGNOSIS — Z13.820 SCREENING FOR OSTEOPOROSIS: ICD-10-CM

## 2024-11-05 DIAGNOSIS — Z00.00 MEDICARE ANNUAL WELLNESS VISIT, SUBSEQUENT: Primary | ICD-10-CM

## 2024-11-05 DIAGNOSIS — E28.39 OVARIAN FAILURE: ICD-10-CM

## 2024-11-05 PROBLEM — S80.01XA CONTUSION OF RIGHT KNEE: Status: RESOLVED | Noted: 2022-03-01 | Resolved: 2024-11-05

## 2024-11-05 PROCEDURE — G0439 PPPS, SUBSEQ VISIT: HCPCS | Performed by: FAMILY MEDICINE

## 2024-11-05 NOTE — PATIENT INSTRUCTIONS
Medicare Preventive Visit Patient Instructions  Thank you for completing your Welcome to Medicare Visit or Medicare Annual Wellness Visit today. Your next wellness visit will be due in one year (11/6/2025).  The screening/preventive services that you may require over the next 5-10 years are detailed below. Some tests may not apply to you based off risk factors and/or age. Screening tests ordered at today's visit but not completed yet may show as past due. Also, please note that scanned in results may not display below.  Preventive Screenings:  Service Recommendations Previous Testing/Comments   Colorectal Cancer Screening  * Colonoscopy    * Fecal Occult Blood Test (FOBT)/Fecal Immunochemical Test (FIT)  * Fecal DNA/Cologuard Test  * Flexible Sigmoidoscopy Age: 45-75 years old   Colonoscopy: every 10 years (may be performed more frequently if at higher risk)  OR  FOBT/FIT: every 1 year  OR  Cologuard: every 3 years  OR  Sigmoidoscopy: every 5 years  Screening may be recommended earlier than age 45 if at higher risk for colorectal cancer. Also, an individualized decision between you and your healthcare provider will decide whether screening between the ages of 76-85 would be appropriate. Colonoscopy: 06/22/2023  FOBT/FIT: Not on file  Cologuard: Not on file  Sigmoidoscopy: Not on file          Breast Cancer Screening Age: 40+ years old  Frequency: every 1-2 years  Not required if history of left and right mastectomy Mammogram: 09/14/2023        Cervical Cancer Screening Between the ages of 21-29, pap smear recommended once every 3 years.   Between the ages of 30-65, can perform pap smear with HPV co-testing every 5 years.   Recommendations may differ for women with a history of total hysterectomy, cervical cancer, or abnormal pap smears in past. Pap Smear: Not on file        Hepatitis C Screening Once for adults born between 1945 and 1965  More frequently in patients at high risk for Hepatitis C Hep C Antibody:  09/01/2023        Diabetes Screening 1-2 times per year if you're at risk for diabetes or have pre-diabetes Fasting glucose: 115 mg/dL (3/29/2021)  A1C: 5.4 % (5/22/2024)      Cholesterol Screening Once every 5 years if you don't have a lipid disorder. May order more often based on risk factors. Lipid panel: 05/22/2024          Other Preventive Screenings Covered by Medicare:  Abdominal Aortic Aneurysm (AAA) Screening: covered once if your at risk. You're considered to be at risk if you have a family history of AAA.  Lung Cancer Screening: covers low dose CT scan once per year if you meet all of the following conditions: (1) Age 55-77; (2) No signs or symptoms of lung cancer; (3) Current smoker or have quit smoking within the last 15 years; (4) You have a tobacco smoking history of at least 20 pack years (packs per day multiplied by number of years you smoked); (5) You get a written order from a healthcare provider.  Glaucoma Screening: covered annually if you're considered high risk: (1) You have diabetes OR (2) Family history of glaucoma OR (3)  aged 50 and older OR (4)  American aged 65 and older  Osteoporosis Screening: covered every 2 years if you meet one of the following conditions: (1) You're estrogen deficient and at risk for osteoporosis based off medical history and other findings; (2) Have a vertebral abnormality; (3) On glucocorticoid therapy for more than 3 months; (4) Have primary hyperparathyroidism; (5) On osteoporosis medications and need to assess response to drug therapy.   Last bone density test (DXA Scan): Not on file.  HIV Screening: covered annually if you're between the age of 15-65. Also covered annually if you are younger than 15 and older than 65 with risk factors for HIV infection. For pregnant patients, it is covered up to 3 times per pregnancy.    Immunizations:  Immunization Recommendations   Influenza Vaccine Annual influenza vaccination during flu season is  recommended for all persons aged >= 6 months who do not have contraindications   Pneumococcal Vaccine   * Pneumococcal conjugate vaccine = PCV13 (Prevnar 13), PCV15 (Vaxneuvance), PCV20 (Prevnar 20)  * Pneumococcal polysaccharide vaccine = PPSV23 (Pneumovax) Adults 19-65 yo with certain risk factors or if 65+ yo  If never received any pneumonia vaccine: recommend Prevnar 20 (PCV20)  Give PCV20 if previously received 1 dose of PCV13 or PPSV23   Hepatitis B Vaccine 3 dose series if at intermediate or high risk (ex: diabetes, end stage renal disease, liver disease)   Respiratory syncytial virus (RSV) Vaccine - COVERED BY MEDICARE PART D  * RSVPreF3 (Arexvy) CDC recommends that adults 60 years of age and older may receive a single dose of RSV vaccine using shared clinical decision-making (SCDM)   Tetanus (Td) Vaccine - COST NOT COVERED BY MEDICARE PART B Following completion of primary series, a booster dose should be given every 10 years to maintain immunity against tetanus. Td may also be given as tetanus wound prophylaxis.   Tdap Vaccine - COST NOT COVERED BY MEDICARE PART B Recommended at least once for all adults. For pregnant patients, recommended with each pregnancy.   Shingles Vaccine (Shingrix) - COST NOT COVERED BY MEDICARE PART B  2 shot series recommended in those 19 years and older who have or will have weakened immune systems or those 50 years and older     Health Maintenance Due:      Topic Date Due   • Breast Cancer Screening: Mammogram  09/14/2025   • Colorectal Cancer Screening  06/22/2028   • Hepatitis C Screening  Completed     Immunizations Due:      Topic Date Due   • Influenza Vaccine (1) 09/01/2024   • COVID-19 Vaccine (4 - 2023-24 season) 09/01/2024     Advance Directives   What are advance directives?  Advance directives are legal documents that state your wishes and plans for medical care. These plans are made ahead of time in case you lose your ability to make decisions for yourself. Advance  directives can apply to any medical decision, such as the treatments you want, and if you want to donate organs.   What are the types of advance directives?  There are many types of advance directives, and each state has rules about how to use them. You may choose a combination of any of the following:  Living will:  This is a written record of the treatment you want. You can also choose which treatments you do not want, which to limit, and which to stop at a certain time. This includes surgery, medicine, IV fluid, and tube feedings.   Durable power of  for healthcare (DPAHC):  This is a written record that states who you want to make healthcare choices for you when you are unable to make them for yourself. This person, called a proxy, is usually a family member or a friend. You may choose more than 1 proxy.  Do not resuscitate (DNR) order:  A DNR order is used in case your heart stops beating or you stop breathing. It is a request not to have certain forms of treatment, such as CPR. A DNR order may be included in other types of advance directives.  Medical directive:  This covers the care that you want if you are in a coma, near death, or unable to make decisions for yourself. You can list the treatments you want for each condition. Treatment may include pain medicine, surgery, blood transfusions, dialysis, IV or tube feedings, and a ventilator (breathing machine).  Values history:  This document has questions about your views, beliefs, and how you feel and think about life. This information can help others choose the care that you would choose.  Why are advance directives important?  An advance directive helps you control your care. Although spoken wishes may be used, it is better to have your wishes written down. Spoken wishes can be misunderstood, or not followed. Treatments may be given even if you do not want them. An advance directive may make it easier for your family to make difficult choices about  your care.   Weight Management   Why it is important to manage your weight:  Being overweight increases your risk of health conditions such as heart disease, high blood pressure, type 2 diabetes, and certain types of cancer. It can also increase your risk for osteoarthritis, sleep apnea, and other respiratory problems. Aim for a slow, steady weight loss. Even a small amount of weight loss can lower your risk of health problems.  How to lose weight safely:  A safe and healthy way to lose weight is to eat fewer calories and get regular exercise. You can lose up about 1 pound a week by decreasing the number of calories you eat by 500 calories each day.   Healthy meal plan for weight management:  A healthy meal plan includes a variety of foods, contains fewer calories, and helps you stay healthy. A healthy meal plan includes the following:  Eat whole-grain foods more often.  A healthy meal plan should contain fiber. Fiber is the part of grains, fruits, and vegetables that is not broken down by your body. Whole-grain foods are healthy and provide extra fiber in your diet. Some examples of whole-grain foods are whole-wheat breads and pastas, oatmeal, brown rice, and bulgur.  Eat a variety of vegetables every day.  Include dark, leafy greens such as spinach, kale, elsie greens, and mustard greens. Eat yellow and orange vegetables such as carrots, sweet potatoes, and winter squash.   Eat a variety of fruits every day.  Choose fresh or canned fruit (canned in its own juice or light syrup) instead of juice. Fruit juice has very little or no fiber.  Eat low-fat dairy foods.  Drink fat-free (skim) milk or 1% milk. Eat fat-free yogurt and low-fat cottage cheese. Try low-fat cheeses such as mozzarella and other reduced-fat cheeses.  Choose meat and other protein foods that are low in fat.  Choose beans or other legumes such as split peas or lentils. Choose fish, skinless poultry (chicken or turkey), or lean cuts of red meat  (beef or pork). Before you cook meat or poultry, cut off any visible fat.   Use less fat and oil.  Try baking foods instead of frying them. Add less fat, such as margarine, sour cream, regular salad dressing and mayonnaise to foods. Eat fewer high-fat foods. Some examples of high-fat foods include french fries, doughnuts, ice cream, and cakes.  Eat fewer sweets.  Limit foods and drinks that are high in sugar. This includes candy, cookies, regular soda, and sweetened drinks.  Exercise:  Exercise at least 30 minutes per day on most days of the week. Some examples of exercise include walking, biking, dancing, and swimming. You can also fit in more physical activity by taking the stairs instead of the elevator or parking farther away from stores. Ask your healthcare provider about the best exercise plan for you.      © Copyright [employer] 2018 Information is for End User's use only and may not be sold, redistributed or otherwise used for commercial purposes. All illustrations and images included in CareNotes® are the copyrighted property of [employer]., Inc[employer] or [employer]

## 2024-11-05 NOTE — PROGRESS NOTES
Ambulatory Visit  Name: Sarina Sheridan      : 1955      MRN: [id number]  Encounter Provider: Ebenezer Garcia DO  Encounter Date: 2024   Encounter department: [facility]    Assessment & Plan  Medicare annual wellness visit, subsequent  Medicare wellness visit completed  Overall patient is doing well she will follow-up with neurology  DEXA scan ordered  She is up-to-date on the  rest of her health maintenance   She can follow-up in 3 to 4 months or sooner if needed  Ovarian failure    Orders:    DXA bone density spine hip and pelvis; Future    Screening for osteoporosis    Orders:    DXA bone density spine hip and pelvis; Future      Depression Screening and Follow-up Plan: Patient's depression screening was positive with a PHQ-9 score of 11. Patient assessed for underlying major depression. Brief counseling provided and recommend additional follow-up/re-evaluation next office visit.       Preventive health issues were discussed with patient, and age appropriate screening tests were ordered as noted in patient's After Visit Summary. Personalized health advice and appropriate referrals for health education or preventive services given if needed, as noted in patient's After Visit Summary.    History of Present Illness     Patient presents today for Medicare wellness visit  Reviewed her recent neurology evaluation  Otherwise she has no other acute complaints today       Patient Care Team:  Ebenezer Garcia DO as PCP - General (Family Medicine)    Review of Systems   Constitutional: Negative.    HENT: Negative.     Eyes: Negative.    Respiratory: Negative.     Cardiovascular: Negative.    Gastrointestinal: Negative.    Endocrine: Negative.    Genitourinary: Negative.    Musculoskeletal: Negative.    Skin: Negative.    Allergic/Immunologic: Negative.    Neurological: Negative.    Hematological: Negative.    Psychiatric/Behavioral: Negative.     All other systems reviewed and are  negative.    Medical History Reviewed by provider this encounter:       Annual Wellness Visit Questionnaire   Sarina is here for her Subsequent Wellness visit. Last Medicare Wellness visit information reviewed, patient interviewed and updates made to the record today.      Health Risk Assessment:   Patient rates overall health as fair. Patient feels that their physical health rating is same. Patient is satisfied with their life. Eyesight was rated as same. Hearing was rated as same. Patient feels that their emotional and mental health rating is same. Patients states they are sometimes angry. Patient states they are often unusually tired/fatigued. Pain experienced in the last 7 days has been some. Patient's pain rating has been 5/10. Patient states that she has experienced weight loss or gain in last 6 months. I gained very Easy    Depression Screening:   PHQ-9 Score: 11      Fall Risk Screening:   In the past year, patient has experienced: history of falling in past year    Number of falls: 1  Injured during fall?: No    Feels unsteady when standing or walking?: No    Worried about falling?: No      Urinary Incontinence Screening:   Patient has leaked urine accidently in the last six months. Had that small leakage for years . some times I also had actually had problems with not getting to the bathroom on time.    Home Safety:  Patient has trouble with stairs inside or outside of their home. Patient has working smoke alarms and has no working carbon monoxide detector. Home safety hazards include: loose rugs on the floor and household clutter. Sometime I have a problem not picking up my feet.    Nutrition:   Current diet is Regular, Unhealthy and Frequent junk food. Want to be test for diabetes    Medications:   Patient is currently taking over-the-counter supplements. OTC medications include: see medication list. Patient is able to manage medications.     Activities of Daily Living (ADLs)/Instrumental Activities of  Daily Living (IADLs):   Walk and transfer into and out of bed and chair?: Yes  Dress and groom yourself?: Yes    Bathe or shower yourself?: Yes    Feed yourself? Yes  Do your laundry/housekeeping?: Yes  Manage your money, pay your bills and track your expenses?: Yes  Make your own meals?: Yes    Do your own shopping?: Yes    Previous Hospitalizations:   Any hospitalizations or ED visits within the last 12 months?: Yes    How many hospitalizations have you had in the last year?: 1-2    Hospitalization Comments: I fell on a shore while fishing .    Advance Care Planning:   Living will: Yes    Durable POA for healthcare: Yes    Advanced directive: Yes    Advanced directive counseling given: Yes    End of Life Decisions reviewed with patient: Yes    Provider agrees with end of life decisions: Yes      Cognitive Screening:   Provider or family/friend/caregiver concerned regarding cognition?: No    PREVENTIVE SCREENINGS      Cardiovascular Screening:    General: History Lipid Disorder and Screening Current      Diabetes Screening:     General: Screening Current      Colorectal Cancer Screening:     General: Screening Current      Breast Cancer Screening:     General: Screening Current      Cervical Cancer Screening:    General: Screening Not Indicated      Osteoporosis Screening:    General: Screening Current      Abdominal Aortic Aneurysm (AAA) Screening:        General: Screening Not Indicated      Lung Cancer Screening:     General: Screening Not Indicated      Hepatitis C Screening:    General: Screening Current    Screening, Brief Intervention, and Referral to Treatment (SBIRT)    Screening  Typical number of drinks in a day: 0  Typical number of drinks in a week: 0  Interpretation: Low risk drinking behavior.    AUDIT-C Screenin) How often did you have a drink containing alcohol in the past year? monthly or less  2) How many drinks did you have on a typical day when you were drinking in the past year? 1 to  "2  3) How often did you have 6 or more drinks on one occasion in the past year? never    AUDIT-C Score: 1  Interpretation: Score 0-2 (female): Negative screen for alcohol misuse    Single Item Drug Screening:  How often have you used an illegal drug (including marijuana) or a prescription medication for non-medical reasons in the past year? never    Single Item Drug Screen Score: 0  Interpretation: Negative screen for possible drug use disorder    Brief Intervention  Alcohol & drug use screenings were reviewed. No concerns regarding substance use disorder identified.     Other Counseling Topics:   Car/seat belt/driving safety, skin self-exam, sunscreen and regular weightbearing exercise and calcium and vitamin D intake.   Medicare wellness visit completed  DEXA scan ordered  She will continue her current medications she will follow-up with neurology  She can follow-up with me in  Social Determinants of Health     Food Insecurity: No Food Insecurity (11/5/2024)    Hunger Vital Sign     Worried About Running Out of Food in the Last Year: Never true     Ran Out of Food in the Last Year: Never true   Transportation Needs: No Transportation Needs (11/5/2024)    PRAPARE - Transportation     Lack of Transportation (Medical): No     Lack of Transportation (Non-Medical): No   Housing Stability: Low Risk  (11/5/2024)    Housing Stability Vital Sign     Unable to Pay for Housing in the Last Year: No     Number of Times Moved in the Last Year: 0     Homeless in the Last Year: No   Utilities: Not At Risk (11/5/2024)    [facility] Utilities     Threatened with loss of utilities: No     Vision Screening    Right eye Left eye Both eyes   Without correction      With correction   20/25       Objective     /80 (BP Location: Left arm, Patient Position: Sitting, Cuff Size: Large)   Pulse 70   Temp 97.6 °F (36.4 °C) (Tympanic)   Resp 16   Ht 5' 7\" (1.702 m)   Wt 114 kg (251 lb 12.8 oz)   SpO2 97%   BMI 39.44 kg/m²     Physical " Exam  Vitals and nursing note reviewed.   Constitutional:       Appearance: Normal appearance. She is well-developed.   HENT:      Head: Normocephalic.      Right Ear: External ear normal.      Left Ear: External ear normal.      Nose: Nose normal.   Eyes:      Conjunctiva/sclera: Conjunctivae normal.      Pupils: Pupils are equal, round, and reactive to light.   Cardiovascular:      Rate and Rhythm: Normal rate and regular rhythm.      Heart sounds: Normal heart sounds.   Pulmonary:      Effort: Pulmonary effort is normal.      Breath sounds: Normal breath sounds.   Abdominal:      General: Bowel sounds are normal.      Palpations: Abdomen is soft.   Musculoskeletal:         General: Normal range of motion.      Cervical back: Normal range of motion and neck supple.   Skin:     General: Skin is warm and dry.   Neurological:      General: No focal deficit present.      Mental Status: She is alert and oriented to person, place, and time.   Psychiatric:         Behavior: Behavior normal.         Thought Content: Thought content normal.         Judgment: Judgment normal.

## 2024-12-09 ENCOUNTER — OFFICE VISIT (OUTPATIENT)
Dept: FAMILY MEDICINE CLINIC | Facility: CLINIC | Age: 69
End: 2024-12-09
Payer: MEDICARE

## 2024-12-09 VITALS
WEIGHT: 250.2 LBS | DIASTOLIC BLOOD PRESSURE: 80 MMHG | SYSTOLIC BLOOD PRESSURE: 142 MMHG | RESPIRATION RATE: 16 BRPM | BODY MASS INDEX: 39.27 KG/M2 | OXYGEN SATURATION: 95 % | HEIGHT: 67 IN | HEART RATE: 60 BPM | TEMPERATURE: 98.1 F

## 2024-12-09 DIAGNOSIS — R31.9 URINARY TRACT INFECTION WITH HEMATURIA, SITE UNSPECIFIED: Primary | ICD-10-CM

## 2024-12-09 DIAGNOSIS — K58.0 IRRITABLE BOWEL SYNDROME WITH DIARRHEA: ICD-10-CM

## 2024-12-09 DIAGNOSIS — N39.0 URINARY TRACT INFECTION WITH HEMATURIA, SITE UNSPECIFIED: Primary | ICD-10-CM

## 2024-12-09 LAB
SL AMB  POCT GLUCOSE, UA: NORMAL
SL AMB LEUKOCYTE ESTERASE,UA: NORMAL
SL AMB POCT BILIRUBIN,UA: NORMAL
SL AMB POCT BLOOD,UA: NORMAL
SL AMB POCT CLARITY,UA: CLEAR
SL AMB POCT COLOR,UA: NORMAL
SL AMB POCT KETONES,UA: NORMAL
SL AMB POCT NITRITE,UA: NORMAL
SL AMB POCT PH,UA: 5
SL AMB POCT SPECIFIC GRAVITY,UA: 1.01
SL AMB POCT URINE PROTEIN: NORMAL
SL AMB POCT UROBILINOGEN: NORMAL

## 2024-12-09 PROCEDURE — 81002 URINALYSIS NONAUTO W/O SCOPE: CPT | Performed by: FAMILY MEDICINE

## 2024-12-09 PROCEDURE — G2211 COMPLEX E/M VISIT ADD ON: HCPCS | Performed by: FAMILY MEDICINE

## 2024-12-09 PROCEDURE — 99213 OFFICE O/P EST LOW 20 MIN: CPT | Performed by: FAMILY MEDICINE

## 2024-12-09 RX ORDER — NITROFURANTOIN 25; 75 MG/1; MG/1
100 CAPSULE ORAL 2 TIMES DAILY
Qty: 10 CAPSULE | Refills: 0 | Status: SHIPPED | OUTPATIENT
Start: 2024-12-09 | End: 2024-12-14

## 2024-12-09 NOTE — PROGRESS NOTES
Name: Sarina Sheridan      : 1955      MRN: [id number]  Encounter Provider: Ayaka Jones DO  Encounter Date: 2024   Encounter department: [facility]  :  Assessment & Plan  Urinary tract infection with hematuria, site unspecified  Patient has evidence of a UTI.  We will treat her with the Macrobid as indicated.  She will increase her fluids.  We will follow-up with the results of the culture and if any changes need to be made.  She will call if there is no improvement within 2 to 3 days or if her symptoms worsen.  Orders:  •  POCT urine dip  •  Urine culture  •  nitrofurantoin (MACROBID) 100 mg capsule; Take 1 capsule (100 mg total) by mouth 2 (two) times a day for 5 days    Irritable bowel syndrome with diarrhea  The patient does have a history of irritable bowel syndrome and her diarrhea is likely related to a flare related to her current UTI.  She can use Imodium as needed and will maintain adequate hydration.  She will call with any worsening symptoms.             Depression Screening and Follow-up Plan: Patient was screened for depression during today's encounter. They screened negative with a PHQ-9 score of 0.    Urinary Incontinence Plan of Care: counseling topics discussed: practice Kegel (pelvic floor strengthening) exercises, limit alcohol, caffeine, spicy foods, and acidic foods and limiting fluid intake 3-4 hours before bed.       Chief Complaint   Patient presents with   • Urinary Tract Infection     Started Friday   • Diarrhea       History of Present Illness     Sarina Sheridan is a 69 y.o. female who presents with urinary symptoms that started on 2024.  She is also experiencing diarrhea.  She seemed to get better over the weekend and the symptoms came back.    There is increased frequency and urgency,  There is no dysuria.  There are no fevers.  There are chills.    There were 3-4 episodes of diarrhea on Friday and Monday.  There is no nausea,  no vomiting.    There is suprapubic pressure.    She took AZO once this am with relief.  The patient does have an underlying history of irritable bowel syndrome which is likely being flared by her current UTI.      Urinary Tract Infection   This is a chronic problem. The current episode started more than 1 year ago. The quality of the pain is described as burning. The pain is at a severity of 7/10. The patient is experiencing no pain. There has been no fever. Associated symptoms include frequency, hesitancy and urgency. Pertinent negatives include no chills, discharge, flank pain, hematuria, nausea, possible pregnancy, sweats or vomiting. She has tried home medications for the symptoms. The treatment provided mild relief.   Diarrhea   Pertinent negatives include no chills, sweats or vomiting.     Review of Systems   Constitutional: Negative.  Negative for chills.   HENT: Negative.     Eyes: Negative.    Respiratory: Negative.     Cardiovascular: Negative.    Gastrointestinal:  Positive for diarrhea. Negative for nausea and vomiting.   Endocrine: Negative.    Genitourinary:  Positive for frequency, hesitancy and urgency. Negative for flank pain and hematuria.   Musculoskeletal: Negative.    Skin: Negative.    Allergic/Immunologic: Negative.    Neurological: Negative.    Hematological: Negative.    Psychiatric/Behavioral: Negative.       Medical History Reviewed by provider this encounter:  Tobacco  Allergies  Meds  Problems  Med Hx  Surg Hx  Fam Hx     .  Current Outpatient Medications on File Prior to Visit   Medication Sig Dispense Refill   • acetaminophen (TYLENOL) 500 mg tablet Take 1 tablet (500 mg total) by mouth every 6 (six) hours as needed for mild pain or moderate pain 30 tablet 0   • atenolol (TENORMIN) 25 mg tablet Take 1 tablet by mouth once daily 30 tablet 5   • fluticasone (FLONASE) 50 mcg/act nasal spray 2 sprays into each nostril daily 16 g 5   • gabapentin (NEURONTIN) 300 mg capsule Take 1  "capsule (300 mg total) by mouth 2 (two) times a day 60 capsule 5   • ibuprofen (MOTRIN) 400 mg tablet Take 1 tablet (400 mg total) by mouth every 6 (six) hours as needed for mild pain or moderate pain 30 tablet 0   • levothyroxine 150 mcg tablet TAKE 1 TABLET BY MOUTH ONCE DAILY IN THE EARLY MORNING 30 tablet 5   • venlafaxine (EFFEXOR-XR) 150 mg 24 hr capsule Take 1 capsule by mouth once daily 30 capsule 5     No current facility-administered medications on file prior to visit.      Social History     Tobacco Use   • Smoking status: Former     Current packs/day: 0.00     Average packs/day: 1.1 packs/day for 20.7 years (22.5 ttl pk-yrs)     Types: Cigarettes     Start date: 1971     Quit date: 1988     Years since quittin.4     Passive exposure: Never   • Smokeless tobacco: Never   • Tobacco comments:     Quite in    Vaping Use   • Vaping status: Never Used   Substance and Sexual Activity   • Alcohol use: Yes     Alcohol/week: 2.0 standard drinks of alcohol     Types: 2 Glasses of wine per week     Comment: Maybe once a month   • Drug use: Never   • Sexual activity: Not Currently     Partners: Male     Birth control/protection: None        Objective   /80 (BP Location: Left arm, Patient Position: Sitting, Cuff Size: Large)   Pulse 60   Temp 98.1 °F (36.7 °C) (Tympanic)   Resp 16   Ht 5' 7\" (1.702 m)   Wt 113 kg (250 lb 3.2 oz)   SpO2 95%   BMI 39.19 kg/m²      Physical Exam  Constitutional:       General: She is not in acute distress.     Appearance: Normal appearance. She is well-developed. She is not diaphoretic.   HENT:      Head: Normocephalic and atraumatic.      Right Ear: Tympanic membrane, ear canal and external ear normal.      Left Ear: Tympanic membrane, ear canal and external ear normal.      Nose: Nose normal.      Mouth/Throat:      Mouth: Mucous membranes are moist.      Pharynx: Oropharynx is clear. No oropharyngeal exudate.   Eyes:      General: No scleral icterus.   "      Right eye: No discharge.         Left eye: No discharge.      Extraocular Movements: Extraocular movements intact.      Pupils: Pupils are equal, round, and reactive to light.   Neck:      Thyroid: No thyromegaly.      Vascular: No JVD.      Trachea: No tracheal deviation.   Cardiovascular:      Rate and Rhythm: Normal rate and regular rhythm.      Heart sounds: Normal heart sounds. No murmur heard.     No friction rub. No gallop.   Pulmonary:      Effort: Pulmonary effort is normal. No respiratory distress.      Breath sounds: Normal breath sounds. No wheezing or rales.   Chest:      Chest wall: No tenderness.   Abdominal:      General: Abdomen is flat. Bowel sounds are normal. There is no distension.      Palpations: Abdomen is soft. There is no mass.      Tenderness: There is abdominal tenderness. There is no guarding or rebound.      Hernia: No hernia is present.   Musculoskeletal:         General: No tenderness or deformity. Normal range of motion.      Cervical back: Normal range of motion and neck supple.   Lymphadenopathy:      Cervical: No cervical adenopathy.   Skin:     General: Skin is warm and dry.      Coloration: Skin is not pale.      Findings: No erythema or rash.   Neurological:      Mental Status: She is alert and oriented to person, place, and time.      Cranial Nerves: No cranial nerve deficit.      Sensory: No sensory deficit.      Motor: No abnormal muscle tone.      Coordination: Coordination normal.      Deep Tendon Reflexes: Reflexes normal.   Psychiatric:         Mood and Affect: Mood normal.         Behavior: Behavior normal.         Thought Content: Thought content normal.         Judgment: Judgment normal.       Administrative Statements   I have spent a total time of 20 minutes in caring for this patient on the day of the visit/encounter including Prognosis, Risks and benefits of tx options, Instructions for management, Risk factor reductions, Impressions, Documenting in the  medical record, Reviewing / ordering tests, medicine, procedures  , and Obtaining or reviewing history  .

## 2024-12-11 LAB
BACTERIA UR CULT: NORMAL
Lab: NO GROWTH

## 2024-12-12 ENCOUNTER — RESULTS FOLLOW-UP (OUTPATIENT)
Dept: FAMILY MEDICINE CLINIC | Facility: CLINIC | Age: 69
End: 2024-12-12

## 2024-12-17 DIAGNOSIS — J34.89 SINUS PRESSURE: ICD-10-CM

## 2024-12-17 DIAGNOSIS — J30.2 SEASONAL ALLERGIES: ICD-10-CM

## 2024-12-18 RX ORDER — FLUTICASONE PROPIONATE 50 MCG
SPRAY, SUSPENSION (ML) NASAL
Qty: 16 G | Refills: 1 | Status: SHIPPED | OUTPATIENT
Start: 2024-12-18

## 2024-12-27 DIAGNOSIS — E03.9 HYPOTHYROIDISM, UNSPECIFIED TYPE: ICD-10-CM

## 2024-12-27 DIAGNOSIS — M54.2 NECK PAIN: ICD-10-CM

## 2024-12-27 RX ORDER — GABAPENTIN 300 MG/1
300 CAPSULE ORAL 2 TIMES DAILY
Qty: 60 CAPSULE | Refills: 0 | Status: SHIPPED | OUTPATIENT
Start: 2024-12-27

## 2024-12-27 RX ORDER — LEVOTHYROXINE SODIUM 150 UG/1
150 TABLET ORAL EVERY MORNING
Qty: 90 TABLET | Refills: 1 | Status: SHIPPED | OUTPATIENT
Start: 2024-12-27

## 2025-01-27 DIAGNOSIS — F33.1 MODERATE RECURRENT MAJOR DEPRESSION (HCC): ICD-10-CM

## 2025-01-27 DIAGNOSIS — I71.21 ANEURYSM OF ASCENDING AORTA WITHOUT RUPTURE (HCC): ICD-10-CM

## 2025-01-28 RX ORDER — VENLAFAXINE HYDROCHLORIDE 150 MG/1
150 CAPSULE, EXTENDED RELEASE ORAL DAILY
Qty: 90 CAPSULE | Refills: 1 | Status: SHIPPED | OUTPATIENT
Start: 2025-01-28

## 2025-01-28 RX ORDER — ATENOLOL 25 MG/1
25 TABLET ORAL DAILY
Qty: 90 TABLET | Refills: 1 | Status: SHIPPED | OUTPATIENT
Start: 2025-01-28

## 2025-01-29 ENCOUNTER — OFFICE VISIT (OUTPATIENT)
Dept: SLEEP CENTER | Facility: CLINIC | Age: 70
End: 2025-01-29
Payer: MEDICARE

## 2025-01-29 VITALS
DIASTOLIC BLOOD PRESSURE: 78 MMHG | WEIGHT: 252 LBS | BODY MASS INDEX: 39.55 KG/M2 | HEIGHT: 67 IN | SYSTOLIC BLOOD PRESSURE: 128 MMHG

## 2025-01-29 DIAGNOSIS — E66.812 CLASS 2 OBESITY DUE TO EXCESS CALORIES WITHOUT SERIOUS COMORBIDITY WITH BODY MASS INDEX (BMI) OF 39.0 TO 39.9 IN ADULT: ICD-10-CM

## 2025-01-29 DIAGNOSIS — I77.819 DILATATION OF AORTA (HCC): ICD-10-CM

## 2025-01-29 DIAGNOSIS — R06.83 SNORING: ICD-10-CM

## 2025-01-29 DIAGNOSIS — G47.39 SLEEP APNEA-LIKE BEHAVIOR: Primary | ICD-10-CM

## 2025-01-29 DIAGNOSIS — R35.1 NOCTURIA: ICD-10-CM

## 2025-01-29 DIAGNOSIS — F33.1 MODERATE RECURRENT MAJOR DEPRESSION (HCC): ICD-10-CM

## 2025-01-29 DIAGNOSIS — E66.09 CLASS 2 OBESITY DUE TO EXCESS CALORIES WITHOUT SERIOUS COMORBIDITY WITH BODY MASS INDEX (BMI) OF 39.0 TO 39.9 IN ADULT: ICD-10-CM

## 2025-01-29 PROCEDURE — 99205 OFFICE O/P NEW HI 60 MIN: CPT | Performed by: NURSE PRACTITIONER

## 2025-01-29 NOTE — ASSESSMENT & PLAN NOTE
69 y.o. y/o who comes in for evaluation of possible sleep apnea  1.  Snoring/mild daytime sleepiness/forgetfulness -   Body mass index is 39.47 kg/m²., Neck Circumference: 17.  The patient is at risk for obstructive sleep apnea based on history and assessment noted.     STOP-BANG Questionnaire (MARJ)     Snoring Loud enough to hear through a door? Yes   Tired Falling asleep while driving or talking? No   Observed Gasping or choking while sleeping? No   Pressure Hypertension? Yes   BMI >35? Yes   Age  Older then 50? Yes   Neck Size   >17 in Male?  >16 in Female? Yes   Male  No        Risk Low (0-2)  Intermediate (3-4)  High (5-8) high           -  Check a baseline sleep study to assess for obstructive sleep apnea      -  I discussed in depth the types of diagnostic studies and treatment options involved with obstructive sleep apnea      -  I also discussed in depth the risk of leaving sleep apnea untreated, including hypertension, heart failure, arrhythmia, MI and stroke.      -  The patient is agreeable to undergo testing and treatment of sleep apnea.  She is not sure that she would be able to tolerate use of CPAP equipment, but would consider this option for treatment, depending on the severity.  The patient understands the pitfalls they may encounter along the way The patient would likely do best to start with a hybrid full face mask, if PAP therapy is indicated.      Orders:    Home Study; Future

## 2025-01-29 NOTE — ASSESSMENT & PLAN NOTE
We discussed her history of aortic dilation with ascending aortic aneurysm.  We discussed that this is often associated with untreated MARJ.  A home sleep study is ordered.  Orders:    Home Study; Future
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
## 2025-01-29 NOTE — PATIENT INSTRUCTIONS
Patient Instructions:     Schedule home sleep study  After testing, the nurse will call with results and recommendations for plan of treatment     Thank you for trusting me with your care!    I know we often  cover a lot of information at the visit, so if you have follow-up questions, are unclear about the plan, or feel there were important items that we did not discuss or you did not receive clarity on, please don't hesitate to reach out to me.     [employer] messages are preferred for routine matters.  Please make sure to call for urgent matters as there can be a delay in responding to questions over [employer].    IMPORTANT- Prior to a sleep study (at home or in the sleep lab), I strongly recommend contacting your medical insurance first to understand your benefits (including deductible if applicable), coverage for this test, and out of pocket costs.  Even if the test is approved by medical insurance, the cost to you is determined by your medical benefits.   If you have concerns about your out of pocket costs for sleep testing, please contact me/the office before you complete the test and we can discuss if there are alternate options.     I recommend following this advice in general before any lab test, imaging test, doctor visit, surgery, or ordering CPAP supplies as it is best to understand your coverage to avoid unexpected bills after the fact.       Nursing Support:  When: Monday through Friday 7:30A-4:30PM except holidays  Where: Our direct line is [phone]  *3  *1.      If you are having a true emergency please call 911.  In the event that the line is busy or it is after hours please leave a voice message and we will return your call.  Please speak clearly, leaving your full name, birth date, best number to reach you and the reason for your call.   Medication refills: We will need the name of the medication, the dosage, the ordering provider, whether you get a 30 or 90 day refill, and the pharmacy name and  address.  Medications will be ordered by the provider only.  Nurses cannot call in prescriptions.  Please allow 7 days for medication refills.  Physician requested updates: If your provider requested that you call with an update after starting medication, please be ready to provide us the medication and dosage, what time you take your medication, the time you attempt to fall asleep, time you fall asleep, when you wake up, and what time you get out of bed.  Sleep Study Results: We will contact you with sleep study results and/or next steps after the physician has reviewed your testing.

## 2025-01-29 NOTE — ASSESSMENT & PLAN NOTE
She reports waking at least 2-3 times per night for urination.  We discussed that increased frequency of nocturnal urination is often associated with undiagnosed/untreated MARJ.  Treatment often improves night time awakenings for urination.

## 2025-02-19 DIAGNOSIS — M54.2 NECK PAIN: ICD-10-CM

## 2025-02-20 RX ORDER — GABAPENTIN 300 MG/1
300 CAPSULE ORAL 2 TIMES DAILY
Qty: 180 CAPSULE | Refills: 1 | Status: SHIPPED | OUTPATIENT
Start: 2025-02-20

## 2025-02-28 ENCOUNTER — OFFICE VISIT (OUTPATIENT)
Dept: FAMILY MEDICINE CLINIC | Facility: CLINIC | Age: 70
End: 2025-02-28
Payer: MEDICARE

## 2025-02-28 VITALS
HEART RATE: 69 BPM | BODY MASS INDEX: 39.25 KG/M2 | DIASTOLIC BLOOD PRESSURE: 80 MMHG | RESPIRATION RATE: 16 BRPM | OXYGEN SATURATION: 95 % | SYSTOLIC BLOOD PRESSURE: 120 MMHG | HEIGHT: 67 IN | WEIGHT: 250.1 LBS | TEMPERATURE: 96.3 F

## 2025-02-28 DIAGNOSIS — N30.01 ACUTE CYSTITIS WITH HEMATURIA: Primary | ICD-10-CM

## 2025-02-28 DIAGNOSIS — Z12.31 ENCOUNTER FOR SCREENING MAMMOGRAM FOR BREAST CANCER: ICD-10-CM

## 2025-02-28 DIAGNOSIS — R30.0 DYSURIA: ICD-10-CM

## 2025-02-28 LAB
SL AMB  POCT GLUCOSE, UA: NORMAL
SL AMB LEUKOCYTE ESTERASE,UA: NORMAL
SL AMB POCT BILIRUBIN,UA: NORMAL
SL AMB POCT BLOOD,UA: NORMAL
SL AMB POCT CLARITY,UA: CLEAR
SL AMB POCT COLOR,UA: NORMAL
SL AMB POCT KETONES,UA: NORMAL
SL AMB POCT NITRITE,UA: POSITIVE
SL AMB POCT PH,UA: 6
SL AMB POCT SPECIFIC GRAVITY,UA: 1
SL AMB POCT URINE PROTEIN: NORMAL
SL AMB POCT UROBILINOGEN: NORMAL

## 2025-02-28 PROCEDURE — 99213 OFFICE O/P EST LOW 20 MIN: CPT

## 2025-02-28 PROCEDURE — 81002 URINALYSIS NONAUTO W/O SCOPE: CPT

## 2025-02-28 PROCEDURE — G2211 COMPLEX E/M VISIT ADD ON: HCPCS

## 2025-02-28 RX ORDER — NITROFURANTOIN 25; 75 MG/1; MG/1
100 CAPSULE ORAL 2 TIMES DAILY
Qty: 10 CAPSULE | Refills: 0 | Status: SHIPPED | OUTPATIENT
Start: 2025-02-28 | End: 2025-03-05

## 2025-02-28 NOTE — PROGRESS NOTES
Name: Sarina Sheridan      : 1955      MRN: [id number]  Encounter Provider: WIL Moore  Encounter Date: 2025   Encounter department: [facility]  :  Assessment & Plan  Acute cystitis with hematuria  Dip + nitrites and leuks. Will send for culture. Pt to begin tx with nitrofurantoin as ordered today. Continue increased fluids, cranberry juice/supplements, AZOs. Continue follow up with Urology.  Orders:  •  nitrofurantoin (MACROBID) 100 mg capsule; Take 1 capsule (100 mg total) by mouth 2 (two) times a day for 5 days    Dysuria  As above.  Orders:  •  POCT urine dip  •  Urine culture  •  nitrofurantoin (MACROBID) 100 mg capsule; Take 1 capsule (100 mg total) by mouth 2 (two) times a day for 5 days    Encounter for screening mammogram for breast cancer    Orders:  •  Mammo screening bilateral w 3d and cad; Future      Follow up as needed or if symptoms worsen or fail to improve.       History of Present Illness {?Quick Links Encounters * My Last Note * Last Note in Specialty * Snapshot * Since Last Visit * History :83466}  Sarina Sheridan is a 69 y.o. year old female who presents today with a concern of a UTI.      Urinary Tract Infection   This is a new problem. The current episode started in the past 7 days (2 days ago). The problem occurs every urination. The problem has been unchanged. The quality of the pain is described as burning and aching (lower abdominal pain and pressure). The pain is at a severity of 6/10. The pain is moderate. There has been no fever. She is Not sexually active. There is No history of pyelonephritis. Associated symptoms include frequency, hesitancy, nausea and urgency. Pertinent negatives include no chills, discharge, flank pain, hematuria, possible pregnancy, sweats or vomiting. She has tried increased fluids (AZO) for the symptoms. The treatment provided mild relief. Her past medical history is significant for recurrent UTIs. There  "is no history of catheterization, kidney stones, a single kidney, urinary stasis or a urological procedure.     Review of Systems   Constitutional: Negative.  Negative for chills, fatigue and fever.   HENT: Negative.  Negative for congestion, ear pain, rhinorrhea and sore throat.    Eyes: Negative.  Negative for pain and visual disturbance.   Respiratory: Negative.  Negative for cough and shortness of breath.    Cardiovascular: Negative.  Negative for chest pain, palpitations and leg swelling.   Gastrointestinal:  Positive for nausea. Negative for abdominal pain, constipation, diarrhea and vomiting.   Endocrine: Negative.    Genitourinary:  Positive for decreased urine volume, difficulty urinating, dysuria, frequency, hesitancy, pelvic pain and urgency. Negative for flank pain, hematuria, vaginal bleeding, vaginal discharge and vaginal pain.   Musculoskeletal: Negative.  Negative for back pain and myalgias.   Skin: Negative.  Negative for rash.   Allergic/Immunologic: Negative.    Neurological: Negative.  Negative for dizziness, weakness, light-headedness and headaches.   Hematological: Negative.    Psychiatric/Behavioral: Negative.         Objective {?Quick Links Trend Vitals * Enter New Vitals * Results Review * Timeline (Adult) * Labs * Imaging * Cardiology * Procedures * Lung Cancer Screening * Surgical eConsent :37307}  /80 (BP Location: Left arm, Patient Position: Sitting, Cuff Size: Standard)   Pulse 69   Temp (!) 96.3 °F (35.7 °C) (Tympanic)   Resp 16   Ht 5' 7\" (1.702 m)   Wt 113 kg (250 lb 1.6 oz)   SpO2 95%   BMI 39.17 kg/m²      Physical Exam  Vitals and nursing note reviewed.   Constitutional:       General: She is not in acute distress.     Appearance: Normal appearance. She is not ill-appearing.   HENT:      Head: Normocephalic and atraumatic.      Right Ear: External ear normal.      Left Ear: External ear normal.      Nose: Nose normal.      Mouth/Throat:      Mouth: Mucous membranes " are moist.      Pharynx: Oropharynx is clear.   Eyes:      Extraocular Movements: Extraocular movements intact.      Conjunctiva/sclera: Conjunctivae normal.      Pupils: Pupils are equal, round, and reactive to light.   Cardiovascular:      Rate and Rhythm: Normal rate and regular rhythm.      Heart sounds: Normal heart sounds. No murmur heard.  Pulmonary:      Effort: Pulmonary effort is normal. No respiratory distress.      Breath sounds: Normal breath sounds. No wheezing.   Abdominal:      General: Abdomen is flat. Bowel sounds are normal.      Palpations: Abdomen is soft.      Tenderness: There is abdominal tenderness in the suprapubic area. There is no right CVA tenderness, left CVA tenderness or rebound.   Musculoskeletal:         General: Normal range of motion.   Skin:     General: Skin is warm and dry.      Capillary Refill: Capillary refill takes less than 2 seconds.   Neurological:      General: No focal deficit present.      Mental Status: She is alert and oriented to person, place, and time.   Psychiatric:         Mood and Affect: Mood normal.         Behavior: Behavior normal.

## 2025-03-04 LAB
BACTERIA UR CULT: ABNORMAL
Lab: ABNORMAL
SL AMB ANTIMICROBIAL SUSCEPTIBILITY: ABNORMAL

## 2025-03-05 ENCOUNTER — TELEPHONE (OUTPATIENT)
Dept: ADMINISTRATIVE | Facility: OTHER | Age: 70
End: 2025-03-05

## 2025-03-05 ENCOUNTER — RESULTS FOLLOW-UP (OUTPATIENT)
Dept: FAMILY MEDICINE CLINIC | Facility: CLINIC | Age: 70
End: 2025-03-05

## 2025-03-05 ENCOUNTER — OFFICE VISIT (OUTPATIENT)
Dept: URGENT CARE | Facility: CLINIC | Age: 70
End: 2025-03-05
Payer: MEDICARE

## 2025-03-05 VITALS
HEIGHT: 67 IN | OXYGEN SATURATION: 96 % | SYSTOLIC BLOOD PRESSURE: 134 MMHG | BODY MASS INDEX: 39.24 KG/M2 | DIASTOLIC BLOOD PRESSURE: 82 MMHG | TEMPERATURE: 99 F | RESPIRATION RATE: 16 BRPM | WEIGHT: 250 LBS | HEART RATE: 92 BPM

## 2025-03-05 DIAGNOSIS — R68.89 FLU-LIKE SYMPTOMS: Primary | ICD-10-CM

## 2025-03-05 PROCEDURE — 99213 OFFICE O/P EST LOW 20 MIN: CPT | Performed by: PHYSICIAN ASSISTANT

## 2025-03-05 PROCEDURE — G0463 HOSPITAL OUTPT CLINIC VISIT: HCPCS | Performed by: PHYSICIAN ASSISTANT

## 2025-03-05 PROCEDURE — 87636 SARSCOV2 & INF A&B AMP PRB: CPT | Performed by: PHYSICIAN ASSISTANT

## 2025-03-05 RX ORDER — OSELTAMIVIR PHOSPHATE 75 MG/1
75 CAPSULE ORAL EVERY 12 HOURS SCHEDULED
Qty: 10 CAPSULE | Refills: 0 | Status: SHIPPED | OUTPATIENT
Start: 2025-03-05 | End: 2025-03-10

## 2025-03-05 NOTE — PATIENT INSTRUCTIONS
Follow-up with your primary care provider in the next 3-5 days.  Any new or worsening symptoms develop get re-evaluated sooner or proceed to the ER.    Start taking Tamiflu as prescribed.  If your swab results come back positive for influenza then continue taking Tamiflu as prescribed.  If your swab results come back negative for influenza then discontinue Tamiflu.

## 2025-03-05 NOTE — LETTER
Vaccination Request Form: Influenza      Date Requested: 25  Patient: Sarina Sheridan  Patient : 1955   Referring Provider: Ebenezer Garcia DO       The above patient has informed us that they have had their   most recent Influenza administered at your facility. Please   complete this form and attach all corresponding documentation.    Date of Vaccine(s) Given  ______________________________    Lot Number(s) _______________________________________    Manufacture(s) ______________________________________    Dose Amount (s) _____________________________________    Expiration Date(s) ____________________________________    Comments __________________________________________________________  ____________________________________________________________________  ____________________________________________________________________  ____________________________________________________________________    Administering Facility  ________________________________________________    Vaccine Administered By (print name) ___________________________________      Form Completed By (print name) _______________________________________      Signature ___________________________________________________________      These reports are needed for  compliance.    Please fax this completed form and a copy of the Vaccine Document(s) to our office located at [address] [place] [address] as soon as possible to Fax [phone] [facility]: Phone [phone]    We thank you for your assistance in treating our mutual patient.

## 2025-03-05 NOTE — LETTER
Vaccination Request Form: Influenza      Date Requested: 25  Patient: Sarina Sheridan  Patient : 1955   Referring Provider: Ebenezer Garcia DO       The above patient has informed us that they have had their   most recent Influenza administered at your facility. Please   complete this form and attach all corresponding documentation.    Date of Vaccine(s) Given  ______________________________    Lot Number(s) _______________________________________    Manufacture(s) ______________________________________    Dose Amount (s) _____________________________________    Expiration Date(s) ____________________________________    Comments _Dos 3-2025 _________________________________________________________  ____________________________________________________________________  ____________________________________________________________________  ____________________________________________________________________    Administering Facility  ________________________________________________    Vaccine Administered By (print name) ___________________________________      Form Completed By (print name) _______________________________________      Signature ___________________________________________________________      These reports are needed for  compliance.    Please fax this completed form and a copy of the Vaccine Document(s) to our office located at [address] [place] [address] as soon as possible to Fax [phone] [facility]: Phone [phone]    We thank you for your assistance in treating our mutual patient.

## 2025-03-05 NOTE — TELEPHONE ENCOUNTER
Upon review of the In Basket request and the patient's chart, initial outreach has been made via telephone call to facility. The outcome of the telephone call was a fax request form to be sent to Office/Facility. Please see Contacts section for details.     Call - fx x1 flu    Thank you  Nicole Petersen

## 2025-03-05 NOTE — PROGRESS NOTES
"  [facility]        NAME: Sarina Sheridan is a 69 y.o. female  : 1955    MRN: [id number]  DATE: 2025  TIME: 11:10 AM    Assessment and Plan   Flu-like symptoms [R68.89]  1. Flu-like symptoms  oseltamivir (TAMIFLU) 75 mg capsule    Covid19 and INFLUENZA A/B PCR            Patient Instructions       Follow up with PCP in 3-5 days.  Proceed to  ER if symptoms worsen.    If tests have been performed at [facility], our office will contact you with results if changes need to be made to the care plan discussed with you at the visit.  [facility]    Chief Complaint     Chief Complaint   Patient presents with    Cold Like Symptoms     Pt reports last night she developed chills, sore throat, PND, congestion, body aches, back pain and cough. Tmax 100.2. Ibuprofen at 0800. Reports dizziness with standing. Received flu vaccine 2 days ago. Completed macrobid today for UTI.         History of Present Illness       Patient presents with this morning developing fever, chills, sore throat, congestion, runny nose, body aches \"all over\", back pain, cough.   States always has back pain but that feels like normal back pain.   Denies chest pains, SOB, dyspnea, sick contacts.         Review of Systems   Review of Systems   Constitutional:  Positive for chills and fever.   HENT:  Positive for congestion, postnasal drip, rhinorrhea, sinus pressure and sore throat. Negative for ear discharge and ear pain.    Respiratory:  Positive for cough. Negative for chest tightness, shortness of breath and wheezing.    Cardiovascular:  Negative for chest pain and palpitations.   Musculoskeletal:  Positive for arthralgias, back pain and myalgias.   Neurological:  Positive for dizziness. Negative for weakness.   Psychiatric/Behavioral:  Negative for confusion.          Current Medications       Current Outpatient Medications:     acetaminophen (TYLENOL) 500 mg tablet, Take 1 tablet (500 mg " total) by mouth every 6 (six) hours as needed for mild pain or moderate pain, Disp: 30 tablet, Rfl: 0    atenolol (TENORMIN) 25 mg tablet, Take 1 tablet (25 mg total) by mouth daily, Disp: 90 tablet, Rfl: 1    fluticasone (FLONASE) 50 mcg/act nasal spray, Use 2 spray(s) in each nostril once daily, Disp: 16 g, Rfl: 1    gabapentin (NEURONTIN) 300 mg capsule, Take 1 capsule (300 mg total) by mouth 2 (two) times a day, Disp: 180 capsule, Rfl: 1    ibuprofen (MOTRIN) 400 mg tablet, Take 1 tablet (400 mg total) by mouth every 6 (six) hours as needed for mild pain or moderate pain, Disp: 30 tablet, Rfl: 0    levothyroxine 150 mcg tablet, TAKE 1 TABLET BY MOUTH ONCE DAILY IN THE MORNING, Disp: 90 tablet, Rfl: 1    nitrofurantoin (MACROBID) 100 mg capsule, Take 1 capsule (100 mg total) by mouth 2 (two) times a day for 5 days, Disp: 10 capsule, Rfl: 0    oseltamivir (TAMIFLU) 75 mg capsule, Take 1 capsule (75 mg total) by mouth every 12 (twelve) hours for 5 days, Disp: 10 capsule, Rfl: 0    venlafaxine (EFFEXOR-XR) 150 mg 24 hr capsule, Take 1 capsule (150 mg total) by mouth daily, Disp: 90 capsule, Rfl: 1    Current Allergies     Allergies as of 03/05/2025 - Reviewed 03/05/2025   Allergen Reaction Noted    Cefuroxime Hives 11/20/2021    Celecoxib Other (See Comments) 11/20/2021    Clindamycin Other (See Comments) 11/20/2021    Duloxetine hcl Other (See Comments) 11/20/2021    Gadolinium derivatives Hives 04/14/2021    Latex Itching 06/07/2024    Medrol [methylprednisolone] Diarrhea 12/09/2024    Penicillins Other (See Comments) 04/14/2021            The following portions of the patient's history were reviewed and updated as appropriate: allergies, current medications, past family history, past medical history, past social history, past surgical history and problem list.     Past Medical History:   Diagnosis Date    Allergic     Very Young teenager    Anemia 1987    Anxiety     Don't know the date in my late twenties     Arthritis     Asthma 2019    Depression     Don't remember the date    Fatty liver     Fibromyalgia     GERD (gastroesophageal reflux disease)     Not sure of the date .    Headache(784.0)     Almost every day    Hypertension     Irritable bowel syndrome     Lactose intolerance     Not sure that I have that but do have diarrhea after drink      milk or have ice cream.k    Memory loss     Memory is been a problem since 66 year of age    Mood swings     Obesity 2003    Pneumonia     Not sure what the date  with in the last 10 years.    Thyroid activity decreased     Urinary tract infection     Have it often    Visual impairment     Have glasses for most of my life       Past Surgical History:   Procedure Laterality Date    BREAST BIOPSY Left     benign    BREAST LUMPECTOMY      Not sure of the date was not cancer.     SECTION  1982    CHOLECYSTECTOMY      COLONOSCOPY          HERNIA REPAIR      Don't remember date done when my gul blader removed.    HYSTERECTOMY      OOPHORECTOMY Right     TUBAL LIGATION      Done after my second child.       Family History   Problem Relation Age of Onset    Diabetes Father     Prostate cancer Father     Heart attack Father     Diabetes Other     Breast cancer Mother     Colon cancer Mother         She  2024    Colon polyps Mother     Hearing loss Mother     Stomach cancer Mother     Breast cancer Sister 60    Asthma Sister         Loida had asthma since she was a child    Colon polyps Sister     Hypothyroidism Sister     Irritable bowel syndrome Sister     Bone cancer Maternal Grandmother     Depression Paternal Grandmother     Colon cancer Paternal Grandfather     Seizures Sister     Stroke Sister         Passed away    Arthritis Sister     Colon polyps Brother     Cancer Paternal Aunt         Fathers sister she had lung cancer.         Medications have been verified.        Objective   /82   Pulse 92   Temp 99 °F (37.2 °C)   Resp 16    "Ht 5' 7\" (1.702 m)   Wt 113 kg (250 lb)   SpO2 96%   BMI 39.16 kg/m²   No LMP recorded. Patient has had a hysterectomy.       Physical Exam     Physical Exam  Constitutional:       General: She is not in acute distress.     Appearance: Normal appearance. She is not ill-appearing or diaphoretic.   HENT:      Right Ear: Tympanic membrane, ear canal and external ear normal.      Left Ear: Tympanic membrane, ear canal and external ear normal.      Nose: Nose normal.      Mouth/Throat:      Mouth: Mucous membranes are moist.      Pharynx: Oropharynx is clear.   Eyes:      Conjunctiva/sclera: Conjunctivae normal.   Cardiovascular:      Rate and Rhythm: Normal rate and regular rhythm.      Heart sounds: Normal heart sounds.   Pulmonary:      Effort: Pulmonary effort is normal.      Breath sounds: Normal breath sounds.   Skin:     General: Skin is warm and dry.   Neurological:      Mental Status: She is alert.   Psychiatric:         Mood and Affect: Mood normal.         Behavior: Behavior normal.                 "

## 2025-03-05 NOTE — LETTER
Vaccination Request Form: Influenza      Date Requested: 25  Patient: Sarina Sheridan  Patient : 1955   Referring Provider: Ebenezer Garcia DO       The above patient has informed us that they have had their   most recent Influenza administered at your facility. Please   complete this form and attach all corresponding documentation.    Date of Vaccine(s) Given  ______________________________    Lot Number(s) _______________________________________    Manufacture(s) ______________________________________    Dose Amount (s) _____________________________________    Expiration Date(s) ____________________________________    Comments __3-2025________________________________________________________  ____________________________________________________________________  ____________________________________________________________________  ____________________________________________________________________    Administering Facility  ________________________________________________    Vaccine Administered By (print name) ___________________________________      Form Completed By (print name) _______________________________________      Signature ___________________________________________________________      These reports are needed for  compliance.    Please fax this completed form and a copy of the Vaccine Document(s) to our office located at [address] [place] [address] as soon as possible to Fax [phone] [facility]: Phone [phone]    We thank you for your assistance in treating our mutual patient.

## 2025-03-05 NOTE — TELEPHONE ENCOUNTER
----- Message from Seth MARCH sent at 3/5/2025  9:13 AM EST -----  Regarding: Flu vaccine  03/05/25 9:14 AM    Hello, our patient Sarina Sheridan has had Immunization(s) completed/performed. Please assist in updating the patient chart by making an External outreach to [facility] pharmacy facility located in [address]. The date of service is 03/04/2025.    Thank you,  Seth Hart MA  [facility]

## 2025-03-06 ENCOUNTER — RESULTS FOLLOW-UP (OUTPATIENT)
Dept: URGENT CARE | Facility: CLINIC | Age: 70
End: 2025-03-06

## 2025-03-06 LAB
FLUAV RNA RESP QL NAA+PROBE: NEGATIVE
FLUBV RNA RESP QL NAA+PROBE: NEGATIVE
SARS-COV-2 RNA RESP QL NAA+PROBE: POSITIVE

## 2025-03-12 ENCOUNTER — OFFICE VISIT (OUTPATIENT)
Dept: FAMILY MEDICINE CLINIC | Facility: CLINIC | Age: 70
End: 2025-03-12
Payer: MEDICARE

## 2025-03-12 VITALS
DIASTOLIC BLOOD PRESSURE: 70 MMHG | SYSTOLIC BLOOD PRESSURE: 120 MMHG | TEMPERATURE: 97.5 F | RESPIRATION RATE: 16 BRPM | OXYGEN SATURATION: 97 % | HEIGHT: 67 IN | HEART RATE: 79 BPM | WEIGHT: 249.4 LBS | BODY MASS INDEX: 39.15 KG/M2

## 2025-03-12 DIAGNOSIS — U07.1 COVID-19: Primary | ICD-10-CM

## 2025-03-12 PROCEDURE — G2211 COMPLEX E/M VISIT ADD ON: HCPCS

## 2025-03-12 PROCEDURE — 99213 OFFICE O/P EST LOW 20 MIN: CPT

## 2025-03-12 NOTE — PROGRESS NOTES
Name: Sarina Sheridan      : 1955      MRN: [id number]  Encounter Provider: WIL Moore  Encounter Date: 3/12/2025   Encounter department: [facility]  :  Assessment & Plan  COVID-19  Rapid Covid + on 3/5. Pt is not a candidate for Paxlovid therapy d/t symptom duration.  Pt instructed on use of Vitamin C, Vitamin D, and Zinc supplementation, continued supportive care therapies, and ED precautions for increased SOB and/or chest pain. Discussed current CDC recommendations for quarantine and masking. Follow up as needed or if symptoms worsen or fail to improve.                History of Present Illness {?Quick Links Encounters * My Last Note * Last Note in Specialty * Snapshot * Since Last Visit * History :02212}  Sarina Sheridan is a 69 y.o. year old female who presents today for follow up from an  visit for flu-like symptoms. Positive Covid test 3/5. Pt reports still with congestion but is feeling much better. Would like to know about testing again for Covid resolution. Taking Advil cold and flu at night - helpful.         Review of Systems   Constitutional: Negative.  Negative for chills, fatigue and fever.   HENT:  Positive for congestion and rhinorrhea. Negative for ear pain and sore throat.    Eyes: Negative.  Negative for pain and visual disturbance.   Respiratory:  Positive for cough (slight) and shortness of breath (occasional with exertion).    Cardiovascular: Negative.  Negative for chest pain, palpitations and leg swelling.   Gastrointestinal:  Negative for abdominal pain, constipation, diarrhea, nausea and vomiting.   Endocrine: Negative.    Genitourinary: Negative.  Negative for dysuria, frequency and urgency.   Musculoskeletal: Negative.  Negative for back pain and myalgias.   Skin: Negative.  Negative for rash.   Allergic/Immunologic: Negative.    Neurological: Negative.  Negative for dizziness, weakness, light-headedness and headaches.  "  Hematological: Negative.    Psychiatric/Behavioral: Negative.         Objective {?Quick Links Trend Vitals * Enter New Vitals * Results Review * Timeline (Adult) * Labs * Imaging * Cardiology * Procedures * Lung Cancer Screening * Surgical eConsent :09661}  /70 (BP Location: Left arm, Patient Position: Sitting, Cuff Size: Standard)   Pulse 79   Temp 97.5 °F (36.4 °C) (Tympanic)   Resp 16   Ht 5' 7\" (1.702 m)   Wt 113 kg (249 lb 6.4 oz)   SpO2 97%   BMI 39.06 kg/m²      Physical Exam  Vitals and nursing note reviewed.   Constitutional:       General: She is not in acute distress.     Appearance: Normal appearance. She is ill-appearing.   HENT:      Head: Normocephalic and atraumatic.      Right Ear: Tympanic membrane, ear canal and external ear normal.      Left Ear: Tympanic membrane, ear canal and external ear normal.      Nose: Congestion and rhinorrhea present. Rhinorrhea is clear.      Mouth/Throat:      Mouth: Mucous membranes are moist.      Pharynx: Oropharynx is clear. No posterior oropharyngeal erythema.   Eyes:      Extraocular Movements: Extraocular movements intact.      Conjunctiva/sclera: Conjunctivae normal.      Pupils: Pupils are equal, round, and reactive to light.   Cardiovascular:      Rate and Rhythm: Normal rate and regular rhythm.      Heart sounds: Normal heart sounds. No murmur heard.  Pulmonary:      Effort: Pulmonary effort is normal. No respiratory distress.      Breath sounds: Normal breath sounds. No wheezing.   Abdominal:      General: Abdomen is flat. Bowel sounds are normal.      Palpations: Abdomen is soft.      Tenderness: There is no abdominal tenderness.   Musculoskeletal:         General: No tenderness. Normal range of motion.      Cervical back: Normal range of motion and neck supple. No tenderness.   Lymphadenopathy:      Cervical: No cervical adenopathy.   Skin:     General: Skin is warm and dry.      Capillary Refill: Capillary refill takes less than 2 " seconds.      Findings: No bruising or rash.   Neurological:      General: No focal deficit present.      Mental Status: She is alert and oriented to person, place, and time.   Psychiatric:         Mood and Affect: Mood normal.         Behavior: Behavior normal.

## 2025-03-12 NOTE — PATIENT INSTRUCTIONS
You can take Vitamin C, Vitamin D, and Zinc to help with your symptoms. Make sure to drink plenty of fluids. You can also use a cool mist vaporizer, Vicks Vaporub, warm tea with honey, soup, and Tylenol and/or ibuprofen for headaches or fever.    You can go back to your normal activities when, for at least 24 hours, both are true: their symptoms are getting better overall and they have not had a fever (and are not using fever-reducing medication).    When going back to your normal activities, take added precaution over the next 5 days, such as taking additional steps for  air, hygiene, masks, physical distancing, and/or testing when you will be around other people indoors. You may still be able to spread the virus that made you sick, even if you are feeling better

## 2025-03-12 NOTE — TELEPHONE ENCOUNTER
As a follow-up, a second attempt has been made for outreach via fax to facility. Please see Contacts section for details.    X2 fx     Thank you  Nicole Petersen

## 2025-03-17 ENCOUNTER — HOSPITAL ENCOUNTER (OUTPATIENT)
Dept: BONE DENSITY | Facility: CLINIC | Age: 70
Discharge: HOME | End: 2025-03-17
Payer: MEDICARE

## 2025-03-17 VITALS — WEIGHT: 249 LBS | BODY MASS INDEX: 39.08 KG/M2 | HEIGHT: 67 IN

## 2025-03-17 DIAGNOSIS — E28.39 OVARIAN FAILURE: ICD-10-CM

## 2025-03-17 DIAGNOSIS — Z13.820 SCREENING FOR OSTEOPOROSIS: ICD-10-CM

## 2025-03-17 PROCEDURE — 77080 DXA BONE DENSITY AXIAL: CPT

## 2025-03-19 NOTE — TELEPHONE ENCOUNTER
As a final attempt, a third outreach has been made via fax to facility. Please see Contacts section for details. This encounter will be closed and completed by end of day. Should we receive the requested information because of previous outreach attempts, the requested patient's chart will be updated appropriately.     Thank you  Nicole Petersen

## 2025-03-20 ENCOUNTER — RESULTS FOLLOW-UP (OUTPATIENT)
Dept: FAMILY MEDICINE CLINIC | Facility: CLINIC | Age: 70
End: 2025-03-20

## 2025-03-20 DIAGNOSIS — E03.9 HYPOTHYROIDISM, UNSPECIFIED TYPE: ICD-10-CM

## 2025-03-20 RX ORDER — LEVOTHYROXINE SODIUM 150 UG/1
150 TABLET ORAL EVERY MORNING
Qty: 90 TABLET | Refills: 1 | Status: SHIPPED | OUTPATIENT
Start: 2025-03-20

## 2025-04-08 ENCOUNTER — HOSPITAL ENCOUNTER (OUTPATIENT)
Facility: HOSPITAL | Age: 70
Discharge: HOME | End: 2025-04-08
Payer: MEDICARE

## 2025-04-08 DIAGNOSIS — I77.819 DILATATION OF AORTA (HCC): ICD-10-CM

## 2025-04-08 DIAGNOSIS — R06.83 SNORING: ICD-10-CM

## 2025-04-08 DIAGNOSIS — G47.39 SLEEP APNEA-LIKE BEHAVIOR: ICD-10-CM

## 2025-04-08 PROCEDURE — G0399 HOME SLEEP TEST/TYPE 3 PORTA: HCPCS

## 2025-04-08 NOTE — PROGRESS NOTES
Home Sleep Study Documentation    HOME STUDY DEVICE: Noxturnal no                                           Ritu G3 yes device # 11      Pre-Sleep Home Study:    Set-up and instructions performed by: Crissy    Technician performed demonstration for Patient: yes    Return demonstration performed by Patient: yes    Written instructions provided to Patient: yes    Patient signed consent form: yes        Post-Sleep Home Study:    Additional comments by Patient: pending    Home Sleep Study Failed:pending    Failure reason: pending    Reported or Detected: pending    Scored by: pending

## 2025-04-10 ENCOUNTER — OFFICE VISIT (OUTPATIENT)
Dept: NEUROLOGY | Facility: CLINIC | Age: 70
End: 2025-04-10
Payer: MEDICARE

## 2025-04-10 VITALS
OXYGEN SATURATION: 95 % | SYSTOLIC BLOOD PRESSURE: 136 MMHG | WEIGHT: 250 LBS | DIASTOLIC BLOOD PRESSURE: 78 MMHG | HEART RATE: 62 BPM | HEIGHT: 67 IN | TEMPERATURE: 98.1 F | BODY MASS INDEX: 39.24 KG/M2

## 2025-04-10 DIAGNOSIS — I68.0 CEREBRAL AMYLOID ANGIOPATHY  (HCC): Primary | ICD-10-CM

## 2025-04-10 DIAGNOSIS — G31.84 MILD COGNITIVE IMPAIRMENT: ICD-10-CM

## 2025-04-10 DIAGNOSIS — E85.4 CEREBRAL AMYLOID ANGIOPATHY  (HCC): Primary | ICD-10-CM

## 2025-04-10 PROCEDURE — 99214 OFFICE O/P EST MOD 30 MIN: CPT | Performed by: PSYCHIATRY & NEUROLOGY

## 2025-04-10 PROCEDURE — G2211 COMPLEX E/M VISIT ADD ON: HCPCS | Performed by: PSYCHIATRY & NEUROLOGY

## 2025-04-10 NOTE — ASSESSMENT & PLAN NOTE
Patient with a mild cognitive impairment in the setting of amyloid angiopathy. Today she reports she is stable. She had her home sleep study done this past Tuesday. Results pending.     I do believe that patients memory concerns are secondary to likely underlying sleep apnea. Which she is at high risk per sleep medicine stop bang screen. Counseled on MARJ being a risk factor for stroke. Emphasized importance of following up with sleep medicine

## 2025-04-10 NOTE — PROGRESS NOTES
Name: Sarina Sheridan      : 1955      MRN: [id number]  Encounter Provider: Danika Gale MD  Encounter Date: 4/10/2025   Encounter department: [facility] [place]  :  Assessment & Plan  Mild cognitive impairment  Patient with a mild cognitive impairment in the setting of amyloid angiopathy. Today she reports she is stable. She had her home sleep study done this past Tuesday. Results pending.     I do believe that patients memory concerns are secondary to likely underlying sleep apnea. Which she is at high risk per sleep medicine stop bang screen. Counseled on MARJ being a risk factor for stroke. Emphasized importance of following up with sleep medicine        Cerebral amyloid angiopathy  (HCC)  Patients sister with history of multiple strokes. MRI in May 2024 showing scattered foci of susceptibility toawrds the frontal vertex.     I reviewed MRI with patient today. I discussed the pathophysiology of amyloid angiopathy and the importance of controlling vascular risk factors. Will repeat MRI brain to assess for interval changes.Follow up with results     Orders:    MRI brain without contrast; Future          History of Present Illness   HPI     Sarina Sheridan is a very pleasant 69 y.o. female with history of anxiety, obesity, depression, aortic dilatation, fibromyalgia, hypothyroidism and thoracic ascending aortic aneurysm who presents for cognitive follow up.     Initial visit (10/10/2024):  Patient reports that since 3 years ago she started having problems with forgetfulness and word finding difficulties.  Examples include walking into her room and forgetting what she is going to do.  She has difficulty with naming things, places and names.  She has placed herself in dangerous situations such as leaving the stove on in the past.  Patient has a history of anxiety and depression which both are treated.  Patient is independent in all of her ADLs.  She does report snoring.   Patient does not have any problems with driving her finances.  In terms of family history she reports having a father with history of dementia and a sister with multiple strokes.  Available workup including MRI brain without contrast done earlier this year showed findings suggesting early amyloid angiopathy.  [place] today 25/30.     At this time, MOCA 1 point off of being within the normal range. Not concerned for an ongoing neurodegenerative process. Modifiable factors include potential MARJ. Her recent MRI shows concern for amyloid angiopathy. Further supporting the diagnosis, includes family history of this in her sister with multiple strokes. Would need to monitor this closely to prevent vascular dementia      Patient referred to sleep medicine for MARJ evaluation.    Workup:    MRI BRAIN WO CONTRAST (Final) 5/26/2024  2:03 PM    Impression  White matter changes suggestive of chronic microangiopathy.  No acute intracranial pathology.    Scattered small foci of susceptibility primarily towards the frontal parietal vertex, nonspecific in appearance possibly representing early amyloid angiopathy.    No MR findings to suggest NPH as clinically questioned.    Sleep medicine consult: Recommended a sleep study to evaluate for MARJ.     Interval history:    Forgetfulness still continues the same   Completed  the home sleep study this past Tuesday   No dangerous situation   Independent in all ADLs and no problems driving     Review of Systems   Constitutional:  Negative for appetite change, fatigue and fever.   HENT: Negative.  Negative for hearing loss, tinnitus, trouble swallowing and voice change.    Eyes: Negative.  Negative for photophobia, pain and visual disturbance.   Respiratory: Negative.  Negative for shortness of breath.    Cardiovascular: Negative.  Negative for palpitations.   Gastrointestinal: Negative.  Negative for nausea and vomiting.   Endocrine: Negative.  Negative for cold intolerance.   Genitourinary:  "Negative.  Negative for dysuria, frequency and urgency.   Musculoskeletal:  Negative for back pain, gait problem, myalgias, neck pain and neck stiffness.   Skin: Negative.  Negative for rash.   Allergic/Immunologic: Negative.    Neurological:  Negative for dizziness, tremors, seizures, syncope, facial asymmetry, speech difficulty, weakness, light-headedness, numbness and headaches.        Pt states she would like an MRI done due to previous Hx.   Hematological: Negative.  Does not bruise/bleed easily.   Psychiatric/Behavioral: Negative.  Negative for confusion, hallucinations and sleep disturbance.    All other systems reviewed and are negative.   I have personally reviewed the MA's review of systems and made changes as necessary.         Objective   /78 (BP Location: Left arm, Patient Position: Sitting, Cuff Size: Standard)   Pulse 62   Temp 98.1 °F (36.7 °C) (Temporal)   Ht 5' 7\" (1.702 m)   Wt 113 kg (250 lb)   SpO2 95%   BMI 39.16 kg/m²     Physical Exam  Neurological Exam          "

## 2025-04-15 ENCOUNTER — HOSPITAL ENCOUNTER (OUTPATIENT)
Dept: MAMMOGRAPHY | Facility: CLINIC | Age: 70
Discharge: HOME | End: 2025-04-15
Payer: MEDICARE

## 2025-04-15 VITALS — WEIGHT: 250 LBS | HEIGHT: 67 IN | BODY MASS INDEX: 39.24 KG/M2

## 2025-04-15 DIAGNOSIS — Z12.31 ENCOUNTER FOR SCREENING MAMMOGRAM FOR BREAST CANCER: ICD-10-CM

## 2025-04-15 PROCEDURE — 77067 SCR MAMMO BI INCL CAD: CPT

## 2025-04-15 PROCEDURE — 77063 BREAST TOMOSYNTHESIS BI: CPT

## 2025-04-16 PROBLEM — G47.33 OSA (OBSTRUCTIVE SLEEP APNEA): Status: ACTIVE | Noted: 2025-04-16

## 2025-04-17 PROCEDURE — 95806 SLEEP STUDY UNATT&RESP EFFT: CPT | Performed by: PSYCHIATRY & NEUROLOGY

## 2025-04-18 ENCOUNTER — RESULTS FOLLOW-UP (OUTPATIENT)
Dept: SLEEP CENTER | Facility: CLINIC | Age: 70
End: 2025-04-18

## 2025-04-18 DIAGNOSIS — G31.84 MILD COGNITIVE IMPAIRMENT: ICD-10-CM

## 2025-04-18 DIAGNOSIS — G47.01 INSOMNIA DUE TO MEDICAL CONDITION: ICD-10-CM

## 2025-04-18 DIAGNOSIS — G47.33 OSA (OBSTRUCTIVE SLEEP APNEA): Primary | ICD-10-CM

## 2025-04-18 DIAGNOSIS — R35.1 NOCTURIA: ICD-10-CM

## 2025-04-18 DIAGNOSIS — F33.1 MODERATE RECURRENT MAJOR DEPRESSION (HCC): ICD-10-CM

## 2025-04-24 ENCOUNTER — HOSPITAL ENCOUNTER (OUTPATIENT)
Dept: MRI IMAGING | Facility: HOSPITAL | Age: 70
End: 2025-04-24
Attending: PSYCHIATRY & NEUROLOGY
Payer: MEDICARE

## 2025-04-24 DIAGNOSIS — I68.0 CEREBRAL AMYLOID ANGIOPATHY  (HCC): ICD-10-CM

## 2025-04-24 DIAGNOSIS — E85.4 CEREBRAL AMYLOID ANGIOPATHY  (HCC): ICD-10-CM

## 2025-04-24 PROCEDURE — 70551 MRI BRAIN STEM W/O DYE: CPT

## 2025-04-24 NOTE — TELEPHONE ENCOUNTER
Per chart, patient scheduled CPAP study 10/29/25 in [facility] and is on wait list.

## 2025-04-27 DIAGNOSIS — F33.1 MODERATE RECURRENT MAJOR DEPRESSION (HCC): ICD-10-CM

## 2025-04-28 RX ORDER — VENLAFAXINE HYDROCHLORIDE 150 MG/1
150 CAPSULE, EXTENDED RELEASE ORAL DAILY
Qty: 90 CAPSULE | Refills: 1 | Status: SHIPPED | OUTPATIENT
Start: 2025-04-28

## 2025-05-30 NOTE — TELEPHONE ENCOUNTER
12/27/2023 patient has an upcoming NP problem appointment on 1/26/24 with Dr. Barr. Patient haven't seen a GYN in many years so no MR could be obtain.   
1.85

## 2025-06-11 ENCOUNTER — RA CDI HCC (OUTPATIENT)
Dept: OTHER | Facility: HOSPITAL | Age: 70
End: 2025-06-11

## 2025-06-13 ENCOUNTER — APPOINTMENT (OUTPATIENT)
Dept: RADIOLOGY | Facility: CLINIC | Age: 70
End: 2025-06-13
Payer: MEDICARE

## 2025-06-13 ENCOUNTER — OFFICE VISIT (OUTPATIENT)
Dept: FAMILY MEDICINE CLINIC | Facility: CLINIC | Age: 70
End: 2025-06-13
Payer: MEDICARE

## 2025-06-13 VITALS
SYSTOLIC BLOOD PRESSURE: 104 MMHG | BODY MASS INDEX: 39.83 KG/M2 | TEMPERATURE: 97.4 F | WEIGHT: 253.8 LBS | HEART RATE: 60 BPM | RESPIRATION RATE: 16 BRPM | HEIGHT: 67 IN | DIASTOLIC BLOOD PRESSURE: 60 MMHG | OXYGEN SATURATION: 95 %

## 2025-06-13 DIAGNOSIS — E03.9 HYPOTHYROIDISM, UNSPECIFIED TYPE: Primary | ICD-10-CM

## 2025-06-13 DIAGNOSIS — E66.09 CLASS 2 OBESITY DUE TO EXCESS CALORIES WITHOUT SERIOUS COMORBIDITY WITH BODY MASS INDEX (BMI) OF 39.0 TO 39.9 IN ADULT: ICD-10-CM

## 2025-06-13 DIAGNOSIS — E53.8 B12 DEFICIENCY: ICD-10-CM

## 2025-06-13 DIAGNOSIS — Z13.6 SCREENING FOR CARDIOVASCULAR CONDITION: ICD-10-CM

## 2025-06-13 DIAGNOSIS — E66.812 CLASS 2 OBESITY DUE TO EXCESS CALORIES WITHOUT SERIOUS COMORBIDITY WITH BODY MASS INDEX (BMI) OF 39.0 TO 39.9 IN ADULT: ICD-10-CM

## 2025-06-13 DIAGNOSIS — G89.29 CHRONIC LOW BACK PAIN WITHOUT SCIATICA, UNSPECIFIED BACK PAIN LATERALITY: ICD-10-CM

## 2025-06-13 DIAGNOSIS — L20.9 ATOPIC DERMATITIS, UNSPECIFIED TYPE: ICD-10-CM

## 2025-06-13 DIAGNOSIS — M54.50 CHRONIC LOW BACK PAIN WITHOUT SCIATICA, UNSPECIFIED BACK PAIN LATERALITY: ICD-10-CM

## 2025-06-13 DIAGNOSIS — G62.9 NEUROPATHY: ICD-10-CM

## 2025-06-13 DIAGNOSIS — R73.01 IMPAIRED FASTING GLUCOSE: ICD-10-CM

## 2025-06-13 DIAGNOSIS — E78.5 HYPERLIPIDEMIA, UNSPECIFIED HYPERLIPIDEMIA TYPE: ICD-10-CM

## 2025-06-13 DIAGNOSIS — Z79.899 HIGH RISK MEDICATION USE: ICD-10-CM

## 2025-06-13 PROCEDURE — G2211 COMPLEX E/M VISIT ADD ON: HCPCS | Performed by: FAMILY MEDICINE

## 2025-06-13 PROCEDURE — 72110 X-RAY EXAM L-2 SPINE 4/>VWS: CPT

## 2025-06-13 PROCEDURE — 99214 OFFICE O/P EST MOD 30 MIN: CPT | Performed by: FAMILY MEDICINE

## 2025-06-13 RX ORDER — TRIAMCINOLONE ACETONIDE 1 MG/G
CREAM TOPICAL 2 TIMES DAILY
Qty: 30 G | Refills: 1 | Status: SHIPPED | OUTPATIENT
Start: 2025-06-13

## 2025-06-13 NOTE — PROGRESS NOTES
Name: Sarina Sheridan      : 1955      MRN: [id number]  Encounter Provider: Ebenezer Garcia DO  Encounter Date: 2025   Encounter department: [facility]  :  Assessment & Plan  Hypothyroidism, unspecified type    Orders:    TSH, 3rd generation; Future    Class 2 obesity due to excess calories without serious comorbidity with body mass index (BMI) of 39.0 to 39.9 in adult           Chronic low back pain without sciatica, unspecified back pain laterality    Orders:    XR spine lumbar minimum 4 views non injury; Future    Ambulatory Referral to Physical Therapy; Future    Neuropathy    Orders:    CBC; Future    Comprehensive metabolic panel; Future    Vitamin B12; Future    C-reactive protein; Future    Screening for cardiovascular condition         High risk medication use    Orders:    CBC; Future    Comprehensive metabolic panel; Future    UA (URINE) with reflex to Scope; Future    Impaired fasting glucose    Orders:    Hemoglobin A1C; Future    Hyperlipidemia, unspecified hyperlipidemia type    Orders:    Lipid panel; Future    Atopic dermatitis, unspecified type    Orders:    triamcinolone (KENALOG) 0.1 % cream; Apply topically 2 (two) times a day    B12 deficiency    Orders:    Vitamin B12; Future    Labs ordered  X-ray and physical therapy ordered  Cream ordered for her rash  Patient follow-up with dermatology if not improving  She will follow-up with me in 6 months or sooner if needed      Depression Screening and Follow-up Plan: Patient was screened for depression during today's encounter. They screened negative with a PHQ-9 score of 0.        History of Present Illness   Patient presents today for 6-month check of chronic tissues  She still having some chronic back pain  Some pain in her legs  She has a rash on her neck that has been for couple weeks now patient has history of dry skin    Review of Systems   Constitutional: Negative.    HENT: Negative.     Eyes: Negative.   "  Respiratory: Negative.     Cardiovascular: Negative.    Gastrointestinal: Negative.    Endocrine: Negative.    Genitourinary: Negative.    Musculoskeletal: Negative.    Skin: Negative.    Allergic/Immunologic: Negative.    Neurological: Negative.    Hematological: Negative.    Psychiatric/Behavioral: Negative.     All other systems reviewed and are negative.      Objective   /60 (BP Location: Left arm, Patient Position: Sitting, Cuff Size: Large)   Pulse 60   Temp (!) 97.4 °F (36.3 °C) (Tympanic)   Resp 16   Ht 5' 7\" (1.702 m)   Wt 115 kg (253 lb 12.8 oz)   SpO2 95%   BMI 39.75 kg/m²      Physical Exam  Vitals and nursing note reviewed.   Constitutional:       Appearance: Normal appearance. She is well-developed.   HENT:      Head: Normocephalic.      Right Ear: External ear normal.      Left Ear: External ear normal.      Nose: Nose normal.     Eyes:      Conjunctiva/sclera: Conjunctivae normal.      Pupils: Pupils are equal, round, and reactive to light.       Cardiovascular:      Rate and Rhythm: Normal rate and regular rhythm.      Heart sounds: Normal heart sounds.   Pulmonary:      Effort: Pulmonary effort is normal.      Breath sounds: Normal breath sounds.   Abdominal:      General: Bowel sounds are normal.      Palpations: Abdomen is soft.     Musculoskeletal:         General: Normal range of motion.      Cervical back: Normal range of motion and neck supple.     Skin:     General: Skin is warm and dry.     Neurological:      General: No focal deficit present.      Mental Status: She is alert and oriented to person, place, and time.     Psychiatric:         Behavior: Behavior normal.         Thought Content: Thought content normal.         Judgment: Judgment normal.       "

## 2025-06-17 ENCOUNTER — RESULTS FOLLOW-UP (OUTPATIENT)
Dept: FAMILY MEDICINE CLINIC | Facility: CLINIC | Age: 70
End: 2025-06-17

## 2025-06-18 ENCOUNTER — APPOINTMENT (OUTPATIENT)
Dept: LAB | Facility: CLINIC | Age: 70
End: 2025-06-18
Payer: MEDICARE

## 2025-06-18 DIAGNOSIS — E53.8 B12 DEFICIENCY: ICD-10-CM

## 2025-06-18 DIAGNOSIS — R73.01 IMPAIRED FASTING GLUCOSE: ICD-10-CM

## 2025-06-18 DIAGNOSIS — E78.5 HYPERLIPIDEMIA, UNSPECIFIED HYPERLIPIDEMIA TYPE: ICD-10-CM

## 2025-06-18 DIAGNOSIS — Z79.899 HIGH RISK MEDICATION USE: ICD-10-CM

## 2025-06-18 DIAGNOSIS — E03.9 HYPOTHYROIDISM, UNSPECIFIED TYPE: ICD-10-CM

## 2025-06-18 DIAGNOSIS — G62.9 NEUROPATHY: ICD-10-CM

## 2025-06-18 LAB
ALBUMIN SERPL BCG-MCNC: 4.4 G/DL (ref 3.5–5)
ALP SERPL-CCNC: 58 U/L (ref 34–104)
ALT SERPL W P-5'-P-CCNC: 32 U/L (ref 7–52)
ANION GAP SERPL CALCULATED.3IONS-SCNC: 7 MMOL/L (ref 4–13)
AST SERPL W P-5'-P-CCNC: 25 U/L (ref 13–39)
BILIRUB SERPL-MCNC: 0.45 MG/DL (ref 0.2–1)
BUN SERPL-MCNC: 15 MG/DL (ref 5–25)
CALCIUM SERPL-MCNC: 9.6 MG/DL (ref 8.4–10.2)
CHLORIDE SERPL-SCNC: 105 MMOL/L (ref 96–108)
CHOLEST SERPL-MCNC: 187 MG/DL (ref ?–200)
CO2 SERPL-SCNC: 30 MMOL/L (ref 21–32)
CREAT SERPL-MCNC: 0.78 MG/DL (ref 0.6–1.3)
CRP SERPL QL: 7.8 MG/L
ERYTHROCYTE [DISTWIDTH] IN BLOOD BY AUTOMATED COUNT: 12.6 % (ref 11.6–15.1)
EST. AVERAGE GLUCOSE BLD GHB EST-MCNC: 114 MG/DL
GFR SERPL CREATININE-BSD FRML MDRD: 78 ML/MIN/1.73SQ M
GLUCOSE P FAST SERPL-MCNC: 109 MG/DL (ref 65–99)
HBA1C MFR BLD: 5.6 %
HCT VFR BLD AUTO: 40.6 % (ref 34.8–46.1)
HDLC SERPL-MCNC: 62 MG/DL
HGB BLD-MCNC: 13.7 G/DL (ref 11.5–15.4)
LDLC SERPL CALC-MCNC: 106 MG/DL (ref 0–100)
MCH RBC QN AUTO: 30.7 PG (ref 26.8–34.3)
MCHC RBC AUTO-ENTMCNC: 33.7 G/DL (ref 31.4–37.4)
MCV RBC AUTO: 91 FL (ref 82–98)
NONHDLC SERPL-MCNC: 125 MG/DL
PLATELET # BLD AUTO: 204 THOUSANDS/UL (ref 149–390)
PMV BLD AUTO: 10.3 FL (ref 8.9–12.7)
POTASSIUM SERPL-SCNC: 4.3 MMOL/L (ref 3.5–5.3)
PROT SERPL-MCNC: 7 G/DL (ref 6.4–8.4)
RBC # BLD AUTO: 4.46 MILLION/UL (ref 3.81–5.12)
SODIUM SERPL-SCNC: 142 MMOL/L (ref 135–147)
TRIGL SERPL-MCNC: 93 MG/DL (ref ?–150)
TSH SERPL DL<=0.05 MIU/L-ACNC: 2.28 UIU/ML (ref 0.45–4.5)
VIT B12 SERPL-MCNC: 444 PG/ML (ref 180–914)
WBC # BLD AUTO: 5.23 THOUSAND/UL (ref 4.31–10.16)

## 2025-06-18 PROCEDURE — 82607 VITAMIN B-12: CPT

## 2025-06-18 PROCEDURE — 84443 ASSAY THYROID STIM HORMONE: CPT

## 2025-06-18 PROCEDURE — 80053 COMPREHEN METABOLIC PANEL: CPT

## 2025-06-18 PROCEDURE — 83036 HEMOGLOBIN GLYCOSYLATED A1C: CPT

## 2025-06-18 PROCEDURE — 86140 C-REACTIVE PROTEIN: CPT

## 2025-06-18 PROCEDURE — 85027 COMPLETE CBC AUTOMATED: CPT

## 2025-06-18 PROCEDURE — 80061 LIPID PANEL: CPT

## 2025-06-18 PROCEDURE — 36415 COLL VENOUS BLD VENIPUNCTURE: CPT

## 2025-06-19 ENCOUNTER — CONSULT (OUTPATIENT)
Dept: FAMILY MEDICINE CLINIC | Facility: CLINIC | Age: 70
End: 2025-06-19
Payer: MEDICARE

## 2025-06-19 VITALS
HEART RATE: 70 BPM | WEIGHT: 253.2 LBS | TEMPERATURE: 97.4 F | BODY MASS INDEX: 39.74 KG/M2 | HEIGHT: 67 IN | SYSTOLIC BLOOD PRESSURE: 122 MMHG | DIASTOLIC BLOOD PRESSURE: 82 MMHG | OXYGEN SATURATION: 95 % | RESPIRATION RATE: 16 BRPM

## 2025-06-19 DIAGNOSIS — Z01.818 PREOPERATIVE CLEARANCE: ICD-10-CM

## 2025-06-19 DIAGNOSIS — E03.9 HYPOTHYROIDISM, UNSPECIFIED TYPE: ICD-10-CM

## 2025-06-19 DIAGNOSIS — H26.9 CATARACT OF BOTH EYES, UNSPECIFIED CATARACT TYPE: Primary | ICD-10-CM

## 2025-06-19 PROCEDURE — G2211 COMPLEX E/M VISIT ADD ON: HCPCS | Performed by: FAMILY MEDICINE

## 2025-06-19 PROCEDURE — 99214 OFFICE O/P EST MOD 30 MIN: CPT | Performed by: FAMILY MEDICINE

## 2025-06-19 NOTE — PROGRESS NOTES
"Name: Sarina Sheridan      : 1955      MRN: [id number]  Encounter Provider: Ebenezer Garcia DO  Encounter Date: 2025   Encounter department: [facility]  :  Assessment & Plan  Cataract of both eyes, unspecified cataract type  Patient is medically cleared for procedure  She will continue current medications her health maintenance is up-to-date  She can follow-up in 6 months or sooner if needed       Preoperative clearance  See above       Hypothyroidism, unspecified type  Reviewed recent labs and thyroid is within normal range              History of Present Illness   Patient presents today for preop clearance  For cataract surgery  She is doing well with no acute complaints we reviewed her recent blood work as well which is all normal and looks good      Review of Systems   Constitutional: Negative.    HENT: Negative.     Eyes: Negative.    Respiratory: Negative.     Cardiovascular: Negative.    Gastrointestinal: Negative.    Endocrine: Negative.    Genitourinary: Negative.    Musculoskeletal: Negative.    Skin: Negative.    Allergic/Immunologic: Negative.    Neurological: Negative.    Hematological: Negative.    Psychiatric/Behavioral: Negative.     All other systems reviewed and are negative.      Objective   /82 (BP Location: Left arm, Patient Position: Sitting, Cuff Size: Large)   Pulse 70   Temp (!) 97.4 °F (36.3 °C) (Tympanic)   Resp 16   Ht 5' 7\" (1.702 m)   Wt 115 kg (253 lb 3.2 oz)   SpO2 95%   BMI 39.66 kg/m²      Physical Exam  Vitals and nursing note reviewed.   Constitutional:       Appearance: Normal appearance. She is well-developed.   HENT:      Head: Normocephalic.      Right Ear: External ear normal.      Left Ear: External ear normal.      Nose: Nose normal.     Eyes:      Conjunctiva/sclera: Conjunctivae normal.      Pupils: Pupils are equal, round, and reactive to light.       Cardiovascular:      Rate and Rhythm: Normal rate and regular rhythm.    "   Heart sounds: Normal heart sounds.   Pulmonary:      Effort: Pulmonary effort is normal.      Breath sounds: Normal breath sounds.   Abdominal:      General: Bowel sounds are normal.      Palpations: Abdomen is soft.     Musculoskeletal:         General: Normal range of motion.      Cervical back: Normal range of motion and neck supple.     Skin:     General: Skin is warm and dry.     Neurological:      General: No focal deficit present.      Mental Status: She is alert and oriented to person, place, and time.     Psychiatric:         Behavior: Behavior normal.         Thought Content: Thought content normal.         Judgment: Judgment normal.

## 2025-06-27 ENCOUNTER — TELEPHONE (OUTPATIENT)
Age: 70
End: 2025-06-27

## 2025-06-27 NOTE — TELEPHONE ENCOUNTER
Patient is requesting the h&p for her cataract surgery be re faxed as [facility] says they have not received it. Patient was unsure of the fax number. Please advise

## 2025-07-11 ENCOUNTER — TELEPHONE (OUTPATIENT)
Age: 70
End: 2025-07-11

## 2025-07-11 ENCOUNTER — OFFICE VISIT (OUTPATIENT)
Dept: URGENT CARE | Facility: CLINIC | Age: 70
End: 2025-07-11
Payer: MEDICARE

## 2025-07-11 VITALS
HEART RATE: 82 BPM | HEIGHT: 67 IN | OXYGEN SATURATION: 98 % | WEIGHT: 255 LBS | SYSTOLIC BLOOD PRESSURE: 134 MMHG | BODY MASS INDEX: 40.02 KG/M2 | RESPIRATION RATE: 16 BRPM | TEMPERATURE: 97.9 F | DIASTOLIC BLOOD PRESSURE: 84 MMHG

## 2025-07-11 DIAGNOSIS — R31.9 URINARY TRACT INFECTION WITH HEMATURIA, SITE UNSPECIFIED: Primary | ICD-10-CM

## 2025-07-11 DIAGNOSIS — N39.0 URINARY TRACT INFECTION WITH HEMATURIA, SITE UNSPECIFIED: Primary | ICD-10-CM

## 2025-07-11 LAB
SL AMB  POCT GLUCOSE, UA: NEGATIVE
SL AMB LEUKOCYTE ESTERASE,UA: ABNORMAL
SL AMB POCT BILIRUBIN,UA: NEGATIVE
SL AMB POCT BLOOD,UA: ABNORMAL
SL AMB POCT CLARITY,UA: ABNORMAL
SL AMB POCT COLOR,UA: YELLOW
SL AMB POCT KETONES,UA: NEGATIVE
SL AMB POCT NITRITE,UA: POSITIVE
SL AMB POCT PH,UA: 6.5
SL AMB POCT SPECIFIC GRAVITY,UA: 1.01
SL AMB POCT URINE PROTEIN: NEGATIVE
SL AMB POCT UROBILINOGEN: 0.2

## 2025-07-11 PROCEDURE — G0463 HOSPITAL OUTPT CLINIC VISIT: HCPCS | Performed by: PHYSICIAN ASSISTANT

## 2025-07-11 PROCEDURE — 99213 OFFICE O/P EST LOW 20 MIN: CPT | Performed by: PHYSICIAN ASSISTANT

## 2025-07-11 PROCEDURE — 81002 URINALYSIS NONAUTO W/O SCOPE: CPT | Performed by: PHYSICIAN ASSISTANT

## 2025-07-11 RX ORDER — NITROFURANTOIN 25; 75 MG/1; MG/1
100 CAPSULE ORAL 2 TIMES DAILY
Qty: 14 CAPSULE | Refills: 0 | Status: SHIPPED | OUTPATIENT
Start: 2025-07-11 | End: 2025-07-13

## 2025-07-11 NOTE — PROGRESS NOTES
"[facility]        NAME: Sarina Sheridan is a 69 y.o. female  : 1955    MRN: [id number]  DATE: 2025  TIME: 1:05 PM    /84   Pulse 82   Temp 97.9 °F (36.6 °C)   Resp 16   Ht 5' 7\" (1.702 m)   Wt 116 kg (255 lb)   SpO2 98%   BMI 39.94 kg/m²     Assessment and Plan   Urinary tract infection with hematuria, site unspecified [N39.0, R31.9]  1. Urinary tract infection with hematuria, site unspecified  POCT urine dip    Urine culture    Urine culture    nitrofurantoin (MACROBID) 100 mg capsule            Patient Instructions       Follow up with PCP in 3-5 days.  Proceed to  ER if symptoms worsen.    Chief Complaint     Chief Complaint   Patient presents with    Possible UTI     Pt reports 3 days of urinary burning, frequency and urgency. Denies fevers, N/V or flank pain.          History of Present Illness       Pt with burning with urine for 3 days        Review of Systems   Review of Systems   Constitutional: Negative.    HENT: Negative.     Eyes: Negative.    Respiratory: Negative.     Cardiovascular: Negative.    Gastrointestinal: Negative.    Endocrine: Negative.    Genitourinary:  Positive for dysuria.   Musculoskeletal: Negative.    Skin: Negative.    Allergic/Immunologic: Negative.    Neurological: Negative.    Hematological: Negative.    Psychiatric/Behavioral: Negative.     All other systems reviewed and are negative.        Current Medications     Current Medications[1]    Current Allergies     Allergies as of 2025 - Reviewed 2025   Allergen Reaction Noted    Cefuroxime Hives 2021    Celecoxib Other (See Comments) 2021    Clindamycin Other (See Comments) 2021    Duloxetine hcl Other (See Comments) 2021    Gadolinium derivatives Hives 2021    Latex Itching 2024    Medrol [methylprednisolone] Diarrhea 2024    Penicillins Other (See Comments) 2021            The following portions of the patient's history were " "reviewed and updated as appropriate: allergies, current medications, past family history, past medical history, past social history, past surgical history and problem list.     Past Medical History[2]    Past Surgical History[3]    Family History[4]      Medications have been verified.        Objective   /84   Pulse 82   Temp 97.9 °F (36.6 °C)   Resp 16   Ht 5' 7\" (1.702 m)   Wt 116 kg (255 lb)   SpO2 98%   BMI 39.94 kg/m²        Physical Exam     Physical Exam  Vitals and nursing note reviewed.   Constitutional:       Appearance: Normal appearance. She is normal weight.   HENT:      Head: Normocephalic and atraumatic.     Cardiovascular:      Rate and Rhythm: Normal rate and regular rhythm.      Pulses: Normal pulses.      Heart sounds: Normal heart sounds.   Pulmonary:      Effort: Pulmonary effort is normal.      Breath sounds: Normal breath sounds.   Abdominal:      Palpations: Abdomen is soft.     Musculoskeletal:         General: Normal range of motion.      Cervical back: Normal range of motion and neck supple.     Skin:     General: Skin is warm.      Capillary Refill: Capillary refill takes less than 2 seconds.     Neurological:      Mental Status: She is alert and oriented to person, place, and time.     Psychiatric:         Behavior: Behavior normal.                          [1]   Current Outpatient Medications:     acetaminophen (TYLENOL) 500 mg tablet, Take 1 tablet (500 mg total) by mouth every 6 (six) hours as needed for mild pain or moderate pain, Disp: 30 tablet, Rfl: 0    atenolol (TENORMIN) 25 mg tablet, Take 1 tablet (25 mg total) by mouth daily, Disp: 90 tablet, Rfl: 1    fluticasone (FLONASE) 50 mcg/act nasal spray, Use 2 spray(s) in each nostril once daily, Disp: 16 g, Rfl: 1    gabapentin (NEURONTIN) 300 mg capsule, Take 1 capsule (300 mg total) by mouth 2 (two) times a day, Disp: 180 capsule, Rfl: 1    ibuprofen (MOTRIN) 400 mg tablet, Take 1 tablet (400 mg total) by mouth every " 6 (six) hours as needed for mild pain or moderate pain, Disp: 30 tablet, Rfl: 0    levothyroxine 150 mcg tablet, Take 1 tablet (150 mcg total) by mouth every morning, Disp: 90 tablet, Rfl: 1    nitrofurantoin (MACROBID) 100 mg capsule, Take 1 capsule (100 mg total) by mouth 2 (two) times a day for 7 days, Disp: 14 capsule, Rfl: 0    venlafaxine (EFFEXOR-XR) 150 mg 24 hr capsule, Take 1 capsule (150 mg total) by mouth daily, Disp: 90 capsule, Rfl: 1    triamcinolone (KENALOG) 0.1 % cream, Apply topically 2 (two) times a day, Disp: 30 g, Rfl: 1  [2]   Past Medical History:  Diagnosis Date    Allergic     Very Young teenager    Anemia     Anxiety     Don't know the date in my late twenties    Arthritis     Asthma 2019    Autoimmune disease (HCC)     No sure when    Back pain     Thirties    Bladder problem     Not sure    Chronic diarrhea     40s    Depression     Don't remember the date    Difficulty walking     Environmental and seasonal allergies     When I was in my 20s    Fatty liver     Fibromyalgia     Fibromyalgia, primary     Late 40s    GERD (gastroesophageal reflux disease)     Not sure of the date .    Headache(784.0)     Almost every day    Headache, tension-type     Hypertension     Irritable bowel syndrome     Lactose intolerance     Not sure that I have that but do have diarrhea after drink      milk or have ice cream.k    Memory loss     Memory is been a problem since 66 year of age    Migraine     Early 20s    Mood swings     Obesity 2003    Pneumonia     Not sure what the date  with in the last 10 years.    Sinus problem     Young    Thyroid activity decreased     Urinary tract infection     Have it often    Visual impairment     Have glasses for most of my life   [3]   Past Surgical History:  Procedure Laterality Date    BREAST BIOPSY Left     benign    BREAST CYST EXCISION      BREAST LUMPECTOMY      Not sure of the date was not cancer.    CATARACT EXTRACTION Right      SECTION   1982    CHOLECYSTECTOMY      COLONOSCOPY      2017    HERNIA REPAIR      Don't remember date done when my gul blader removed.    HYSTERECTOMY      OOPHORECTOMY Right     TUBAL LIGATION      Done after my second child.   [4]   Family History  Problem Relation Name Age of Onset    Breast cancer Mother Juli Mei         diagnosed in her 60s    Colon cancer Mother Juli Mei         She  2024    Colon polyps Mother Juli Mei     Hearing loss Mother Juli Mei     Stomach cancer Mother Juli Mei     Diabetes Father Luke Mei     Prostate cancer Father Luke Mei     Heart attack Father Luke Mei     Sleep apnea Father Luke Mei     Snoring Father Luke Mei     Breast cancer Sister Loida pennserella 60    Asthma Sister Loida pennserella         Loida had asthma since she was a child    Colon polyps Sister Loida pennserella     Hypothyroidism Sister Loida pennserella     Irritable bowel syndrome Sister Loida pennserella     Cancer Sister Breanna Mei herpel         unknown primary    Seizures Sister Breanna Mei herpel     Stroke Sister Breanna Mei herpel         Passed away    Arthritis Sister Breanna Mei herpel     Skin cancer Daughter vernon     No Known Problems Daughter      Bone cancer Maternal Grandmother      Colon cancer Maternal Grandfather  56    Depression Paternal Grandmother Grandmother     Anxiety disorder Paternal Grandmother Grandmother     Colon cancer Paternal Grandfather Dev Ryan     Colon polyps Brother Ortiz Mei     Sleep walking Brother Ortiz Mei     Cancer Paternal Aunt Elida alvarenga         Fathers sister she had lung cancer.    No Known Problems Paternal Aunt      No Known Problems Paternal Aunt

## 2025-07-11 NOTE — TELEPHONE ENCOUNTER
Patient called stating she's been having symptoms of a UTI for 3 days, frequency and burning with urination. She is taking Azo now. There are no open appts today. Please advise.    [id number] [place] [facility] [address] [phone]

## 2025-07-13 ENCOUNTER — DOCUMENTATION (OUTPATIENT)
Dept: FAMILY MEDICINE CLINIC | Facility: CLINIC | Age: 70
End: 2025-07-13

## 2025-07-13 ENCOUNTER — NURSE TRIAGE (OUTPATIENT)
Dept: OTHER | Facility: OTHER | Age: 70
End: 2025-07-13

## 2025-07-13 DIAGNOSIS — N30.01 ACUTE CYSTITIS WITH HEMATURIA: Primary | ICD-10-CM

## 2025-07-13 RX ORDER — SULFAMETHOXAZOLE AND TRIMETHOPRIM 800; 160 MG/1; MG/1
1 TABLET ORAL EVERY 12 HOURS SCHEDULED
Qty: 6 TABLET | Refills: 0 | Status: SHIPPED | OUTPATIENT
Start: 2025-07-13 | End: 2025-07-16

## 2025-07-13 NOTE — TELEPHONE ENCOUNTER
A user error has taken place: encounter opened in error, closed for administrative reasons.    Message sent to previous RN to follow up with patient.

## 2025-07-13 NOTE — TELEPHONE ENCOUNTER
"Pt called back and stated, \"I have more questions regarding my symptoms and would like to speak with Radha the nurse.\"    Pt made aware new prescription was sent to pharmacy and given the time receipt.     ESC sent to after hours RN to address triage symptoms.   "

## 2025-07-13 NOTE — TELEPHONE ENCOUNTER
"  REASON FOR CONVERSATION: Medication Problem    SYMPTOMS: red itching rash, fatigue, diarrhea while on Macrobid for UTI    OTHER HEALTH INFORMATION: patient requesting alternative medication and stated her uti symptoms did improve    PROTOCOL DISPOSITION: Call PCP now    CARE ADVICE PROVIDED: On call Provider recommended patient stop the Macrobid, start the Bactrim, take benadryl for itch, call eye doctor on Monday regarding her upcoming surgery    PRACTICE FOLLOW-UP: none needed at this time          Answer Assessment - Initial Assessment Questions  1. MAIN SYMPTOM: \"What is the main symptom you are concerned about?\" (e.g., painful urination, urine frequency)        Rash on back arms and neck- it's red, dry, sore and itchy. Diarrhea while on macrobid. Patient states she is also fatigued.     2. BETTER-SAME-WORSE: \"Are you getting better, staying the same, or getting worse compared to how you felt at your last visit to the doctor (most recent medical visit)?\"        Urinary symptoms have improved    3. PAIN: \"How bad is the pain?\"  (e.g., Scale 1-10; mild, moderate, or severe)        denies    4. FEVER: \"Do you have a fever?\" If Yes, ask: \"What is it, how was it measured, and when did it start?\"        Denies    8. ANTIBIOTIC - START DATE: \"When did you start taking the antibiotic?\"        She is on dose four    Protocols used: Urinary Tract Infection on Antibiotic Follow-up Call - Female-Adult-    "

## 2025-07-13 NOTE — TELEPHONE ENCOUNTER
Reason for Disposition  • Taking new prescription antibiotic  (Exception: Finished taking new prescription antibiotic.)    Protocols used: Rash - Widespread On Drugs-Adult-AH

## 2025-07-14 NOTE — PROGRESS NOTES
Pt w/rash after starting macrobid prescribed by . Pt instructed to stop current abx. Benadryl for rash PRN. Pt instructed to contact eye surgeon about upcoming surgery on 7/15/25. Bactrim to pharmacy. Allergies updated.

## 2025-07-15 LAB
BACTERIA UR CULT: ABNORMAL
Lab: ABNORMAL
SL AMB ANTIMICROBIAL SUSCEPTIBILITY: ABNORMAL

## 2025-07-17 DIAGNOSIS — I71.21 ANEURYSM OF ASCENDING AORTA WITHOUT RUPTURE (HCC): ICD-10-CM

## 2025-07-18 RX ORDER — ATENOLOL 25 MG/1
25 TABLET ORAL DAILY
Qty: 90 TABLET | Refills: 1 | Status: SHIPPED | OUTPATIENT
Start: 2025-07-18

## 2025-08-18 ENCOUNTER — TELEPHONE (OUTPATIENT)
Dept: NEUROLOGY | Facility: CLINIC | Age: 70
End: 2025-08-18

## 2025-08-19 DIAGNOSIS — J34.89 SINUS PRESSURE: ICD-10-CM

## 2025-08-19 DIAGNOSIS — J30.2 SEASONAL ALLERGIES: ICD-10-CM

## 2025-08-19 RX ORDER — FLUTICASONE PROPIONATE 50 MCG
2 SPRAY, SUSPENSION (ML) NASAL DAILY
Qty: 16 G | Refills: 1 | Status: SHIPPED | OUTPATIENT
Start: 2025-08-19

## 2025-08-20 ENCOUNTER — NURSE TRIAGE (OUTPATIENT)
Age: 70
End: 2025-08-20

## 2025-08-21 ENCOUNTER — OFFICE VISIT (OUTPATIENT)
Dept: FAMILY MEDICINE CLINIC | Facility: CLINIC | Age: 70
End: 2025-08-21
Payer: MEDICARE

## 2025-08-21 ENCOUNTER — RESULTS FOLLOW-UP (OUTPATIENT)
Dept: FAMILY MEDICINE CLINIC | Facility: CLINIC | Age: 70
End: 2025-08-21

## 2025-08-21 VITALS
DIASTOLIC BLOOD PRESSURE: 84 MMHG | SYSTOLIC BLOOD PRESSURE: 140 MMHG | WEIGHT: 256 LBS | TEMPERATURE: 98.4 F | HEIGHT: 67 IN | BODY MASS INDEX: 40.18 KG/M2 | RESPIRATION RATE: 16 BRPM | OXYGEN SATURATION: 96 % | HEART RATE: 71 BPM

## 2025-08-21 DIAGNOSIS — M79.671 RIGHT FOOT PAIN: Primary | ICD-10-CM

## 2025-08-21 DIAGNOSIS — E03.9 HYPOTHYROIDISM, UNSPECIFIED TYPE: ICD-10-CM

## 2025-08-21 LAB
T4 FREE SERPL-MCNC: 0.82 NG/DL (ref 0.61–1.12)
TSH SERPL DL<=0.05 MIU/L-ACNC: 5.41 UIU/ML (ref 0.45–4.5)

## 2025-08-21 PROCEDURE — G2211 COMPLEX E/M VISIT ADD ON: HCPCS | Performed by: STUDENT IN AN ORGANIZED HEALTH CARE EDUCATION/TRAINING PROGRAM

## 2025-08-21 PROCEDURE — 99214 OFFICE O/P EST MOD 30 MIN: CPT | Performed by: STUDENT IN AN ORGANIZED HEALTH CARE EDUCATION/TRAINING PROGRAM

## 2025-08-21 PROCEDURE — 84443 ASSAY THYROID STIM HORMONE: CPT | Performed by: STUDENT IN AN ORGANIZED HEALTH CARE EDUCATION/TRAINING PROGRAM

## 2025-08-21 PROCEDURE — 84439 ASSAY OF FREE THYROXINE: CPT | Performed by: STUDENT IN AN ORGANIZED HEALTH CARE EDUCATION/TRAINING PROGRAM

## 2025-08-22 ENCOUNTER — OFFICE VISIT (OUTPATIENT)
Dept: PODIATRY | Facility: CLINIC | Age: 70
End: 2025-08-22
Payer: MEDICARE

## 2025-08-22 VITALS — HEIGHT: 67 IN | BODY MASS INDEX: 40.18 KG/M2 | WEIGHT: 256 LBS

## 2025-08-22 DIAGNOSIS — S92.354A CLOSED NONDISPLACED FRACTURE OF FIFTH METATARSAL BONE OF RIGHT FOOT, INITIAL ENCOUNTER: Primary | ICD-10-CM

## 2025-08-22 DIAGNOSIS — M79.671 RIGHT FOOT PAIN: ICD-10-CM

## 2025-08-22 DIAGNOSIS — E03.9 HYPOTHYROIDISM, UNSPECIFIED TYPE: Primary | ICD-10-CM

## 2025-08-22 PROCEDURE — 99203 OFFICE O/P NEW LOW 30 MIN: CPT | Performed by: PODIATRIST

## 2025-08-27 PROBLEM — I68.0 CEREBRAL AMYLOID ANGIOPATHY  (HCC): Status: ACTIVE | Noted: 2025-08-27

## 2025-08-27 PROBLEM — E85.4 CEREBRAL AMYLOID ANGIOPATHY  (HCC): Status: ACTIVE | Noted: 2025-08-27
